# Patient Record
Sex: FEMALE | Race: WHITE | NOT HISPANIC OR LATINO
[De-identification: names, ages, dates, MRNs, and addresses within clinical notes are randomized per-mention and may not be internally consistent; named-entity substitution may affect disease eponyms.]

---

## 2019-06-09 ENCOUNTER — TRANSCRIPTION ENCOUNTER (OUTPATIENT)
Age: 64
End: 2019-06-09

## 2019-12-31 ENCOUNTER — RECORD ABSTRACTING (OUTPATIENT)
Age: 64
End: 2019-12-31

## 2019-12-31 DIAGNOSIS — Z72.89 OTHER PROBLEMS RELATED TO LIFESTYLE: ICD-10-CM

## 2019-12-31 DIAGNOSIS — Z80.3 FAMILY HISTORY OF MALIGNANT NEOPLASM OF BREAST: ICD-10-CM

## 2019-12-31 DIAGNOSIS — Z87.09 PERSONAL HISTORY OF OTHER DISEASES OF THE RESPIRATORY SYSTEM: ICD-10-CM

## 2019-12-31 DIAGNOSIS — Z80.8 FAMILY HISTORY OF MALIGNANT NEOPLASM OF OTHER ORGANS OR SYSTEMS: ICD-10-CM

## 2019-12-31 DIAGNOSIS — Z86.59 PERSONAL HISTORY OF OTHER MENTAL AND BEHAVIORAL DISORDERS: ICD-10-CM

## 2019-12-31 DIAGNOSIS — Z82.49 FAMILY HISTORY OF ISCHEMIC HEART DISEASE AND OTHER DISEASES OF THE CIRCULATORY SYSTEM: ICD-10-CM

## 2019-12-31 LAB — CYTOLOGY CVX/VAG DOC THIN PREP: NORMAL

## 2020-02-27 ENCOUNTER — APPOINTMENT (OUTPATIENT)
Dept: OBGYN | Facility: CLINIC | Age: 65
End: 2020-02-27

## 2020-06-05 ENCOUNTER — APPOINTMENT (RX ONLY)
Dept: URBAN - METROPOLITAN AREA CLINIC 146 | Facility: CLINIC | Age: 65
Setting detail: DERMATOLOGY
End: 2020-06-05

## 2020-06-05 VITALS — TEMPERATURE: 96.62 F

## 2020-06-05 DIAGNOSIS — L57.8 OTHER SKIN CHANGES DUE TO CHRONIC EXPOSURE TO NONIONIZING RADIATION: ICD-10-CM

## 2020-06-05 DIAGNOSIS — L81.4 OTHER MELANIN HYPERPIGMENTATION: ICD-10-CM

## 2020-06-05 DIAGNOSIS — T07XXXA INSECT BITE, NONVENOMOUS, OF OTHER, MULTIPLE, AND UNSPECIFIED SITES, WITHOUT MENTION OF INFECTION: ICD-10-CM

## 2020-06-05 DIAGNOSIS — L82.1 OTHER SEBORRHEIC KERATOSIS: ICD-10-CM

## 2020-06-05 DIAGNOSIS — D485 NEOPLASM OF UNCERTAIN BEHAVIOR OF SKIN: ICD-10-CM

## 2020-06-05 DIAGNOSIS — L73.8 OTHER SPECIFIED FOLLICULAR DISORDERS: ICD-10-CM

## 2020-06-05 PROBLEM — S20.369A INSECT BITE (NONVENOMOUS) OF UNSPECIFIED FRONT WALL OF THORAX, INITIAL ENCOUNTER: Status: ACTIVE | Noted: 2020-06-05

## 2020-06-05 PROBLEM — S20.162A INSECT BITE (NONVENOMOUS) OF BREAST, LEFT BREAST, INITIAL ENCOUNTER: Status: ACTIVE | Noted: 2020-06-05

## 2020-06-05 PROBLEM — D48.5 NEOPLASM OF UNCERTAIN BEHAVIOR OF SKIN: Status: ACTIVE | Noted: 2020-06-05

## 2020-06-05 PROCEDURE — ? BIOPSY BY SHAVE METHOD

## 2020-06-05 PROCEDURE — ? COUNSELING

## 2020-06-05 PROCEDURE — 99203 OFFICE O/P NEW LOW 30 MIN: CPT | Mod: 25

## 2020-06-05 PROCEDURE — 11102 TANGNTL BX SKIN SINGLE LES: CPT

## 2020-06-05 PROCEDURE — ? FULL BODY SKIN EXAM

## 2020-06-05 ASSESSMENT — LOCATION SIMPLE DESCRIPTION DERM
LOCATION SIMPLE: CHEST
LOCATION SIMPLE: RIGHT UPPER BACK
LOCATION SIMPLE: LEFT CHEEK
LOCATION SIMPLE: RIGHT FOREHEAD
LOCATION SIMPLE: LEFT SHOULDER
LOCATION SIMPLE: LEFT BREAST
LOCATION SIMPLE: LEFT UPPER BACK
LOCATION SIMPLE: RIGHT SHOULDER
LOCATION SIMPLE: RIGHT POSTERIOR UPPER ARM
LOCATION SIMPLE: LEFT ELBOW
LOCATION SIMPLE: RIGHT CHEEK

## 2020-06-05 ASSESSMENT — LOCATION DETAILED DESCRIPTION DERM
LOCATION DETAILED: MIDDLE STERNUM
LOCATION DETAILED: RIGHT ANTERIOR SHOULDER
LOCATION DETAILED: LEFT MID-UPPER BACK
LOCATION DETAILED: LEFT MEDIAL BREAST 10-11:00 REGION
LOCATION DETAILED: RIGHT PROXIMAL POSTERIOR UPPER ARM
LOCATION DETAILED: RIGHT INFERIOR CENTRAL MALAR CHEEK
LOCATION DETAILED: RIGHT MEDIAL UPPER BACK
LOCATION DETAILED: LEFT INFERIOR CENTRAL MALAR CHEEK
LOCATION DETAILED: LEFT SUPERIOR UPPER BACK
LOCATION DETAILED: RIGHT INFERIOR LATERAL FOREHEAD
LOCATION DETAILED: LEFT MEDIAL SUPERIOR CHEST
LOCATION DETAILED: LEFT ANTERIOR SHOULDER
LOCATION DETAILED: LEFT MEDIAL UPPER BACK
LOCATION DETAILED: RIGHT POSTERIOR SHOULDER
LOCATION DETAILED: RIGHT LATERAL FOREHEAD
LOCATION DETAILED: RIGHT INFERIOR UPPER BACK
LOCATION DETAILED: RIGHT INFERIOR FOREHEAD
LOCATION DETAILED: LEFT ELBOW
LOCATION DETAILED: LEFT POSTERIOR SHOULDER

## 2020-06-05 ASSESSMENT — LOCATION ZONE DERM
LOCATION ZONE: FACE
LOCATION ZONE: TRUNK
LOCATION ZONE: ARM

## 2021-09-08 ENCOUNTER — APPOINTMENT (RX ONLY)
Dept: URBAN - METROPOLITAN AREA CLINIC 146 | Facility: CLINIC | Age: 66
Setting detail: DERMATOLOGY
End: 2021-09-08

## 2021-09-08 DIAGNOSIS — L81.4 OTHER MELANIN HYPERPIGMENTATION: ICD-10-CM

## 2021-09-08 DIAGNOSIS — L82.1 OTHER SEBORRHEIC KERATOSIS: ICD-10-CM

## 2021-09-08 DIAGNOSIS — L30.1 DYSHIDROSIS [POMPHOLYX]: ICD-10-CM

## 2021-09-08 PROCEDURE — ? FULL BODY SKIN EXAM - DECLINED

## 2021-09-08 PROCEDURE — 99213 OFFICE O/P EST LOW 20 MIN: CPT

## 2021-09-08 PROCEDURE — ? COUNSELING

## 2021-09-08 PROCEDURE — ? MEDICATION COUNSELING

## 2021-09-08 PROCEDURE — ? ADDITIONAL NOTES

## 2021-09-08 PROCEDURE — ? PRESCRIPTION

## 2021-09-08 RX ORDER — HALOBETASOL PROPIONATE 0.5 MG/G
OINTMENT TOPICAL
Qty: 50 | Refills: 2 | Status: ERX | COMMUNITY
Start: 2021-09-08

## 2021-09-08 RX ADMIN — HALOBETASOL PROPIONATE: 0.5 OINTMENT TOPICAL at 00:00

## 2021-09-08 ASSESSMENT — LOCATION ZONE DERM
LOCATION ZONE: FACE
LOCATION ZONE: TRUNK
LOCATION ZONE: HAND
LOCATION ZONE: ARM

## 2021-09-08 ASSESSMENT — LOCATION DETAILED DESCRIPTION DERM
LOCATION DETAILED: RIGHT INFERIOR UPPER BACK
LOCATION DETAILED: RIGHT LATERAL ABDOMEN
LOCATION DETAILED: RIGHT RADIAL DORSAL HAND
LOCATION DETAILED: LEFT SUPERIOR MEDIAL MALAR CHEEK
LOCATION DETAILED: LEFT ULNAR DORSAL HAND
LOCATION DETAILED: LEFT ANTERIOR SHOULDER

## 2021-09-08 ASSESSMENT — LOCATION SIMPLE DESCRIPTION DERM
LOCATION SIMPLE: ABDOMEN
LOCATION SIMPLE: RIGHT HAND
LOCATION SIMPLE: LEFT HAND
LOCATION SIMPLE: LEFT SHOULDER
LOCATION SIMPLE: RIGHT UPPER BACK
LOCATION SIMPLE: LEFT CHEEK

## 2021-09-08 NOTE — PROCEDURE: MEDICATION COUNSELING
CLINICAL PHARMACY NOTE: MEDS TO [address] Select Patient?: No  Total # of Prescriptions Filled: 2   The following medications were delivered to the patient:  · On file  Total # of Interventions Completed: 1  Time Spent (min): 15    Additional Documentation: Oxybutynin Counseling:  I discussed with the patient the risks of oxybutynin including but not limited to skin rash, drowsiness, dry mouth, difficulty urinating, and blurred vision.

## 2021-10-06 ENCOUNTER — APPOINTMENT (OUTPATIENT)
Dept: INTERNAL MEDICINE | Facility: CLINIC | Age: 66
End: 2021-10-06
Payer: MEDICARE

## 2021-10-06 ENCOUNTER — RESULT CHARGE (OUTPATIENT)
Age: 66
End: 2021-10-06

## 2021-10-06 VITALS
BODY MASS INDEX: 39.61 KG/M2 | SYSTOLIC BLOOD PRESSURE: 104 MMHG | DIASTOLIC BLOOD PRESSURE: 60 MMHG | HEIGHT: 64 IN | WEIGHT: 232 LBS

## 2021-10-06 VITALS
DIASTOLIC BLOOD PRESSURE: 60 MMHG | HEIGHT: 64 IN | WEIGHT: 232 LBS | SYSTOLIC BLOOD PRESSURE: 104 MMHG | BODY MASS INDEX: 39.61 KG/M2

## 2021-10-06 DIAGNOSIS — Z87.2 PERSONAL HISTORY OF DISEASES OF THE SKIN AND SUBCUTANEOUS TISSUE: ICD-10-CM

## 2021-10-06 PROCEDURE — 85610 PROTHROMBIN TIME: CPT | Mod: QW

## 2021-10-06 PROCEDURE — 99203 OFFICE O/P NEW LOW 30 MIN: CPT | Mod: 25

## 2021-10-06 RX ORDER — DESVENLAFAXINE SUCCINATE 50 MG/1
50 TABLET, EXTENDED RELEASE ORAL
Refills: 0 | Status: DISCONTINUED | COMMUNITY
End: 2021-10-06

## 2021-10-06 NOTE — HISTORY OF PRESENT ILLNESS
[FreeTextEntry8] : 66-year-old right-handed female with PAF, initial visit, recently moved back from [place] to be with her 4 children and 7 grandchildren. She recently had an annual exam in [place], records have been requested. The only information she has with her as on her phone which shows a calcium of 11.1 and a PTH of 95 which she was advised to follow up on.\par Patient states she fell forward and landed on her outstretched arms about 4 months ago and her right shoulder still bothers her, she is unable to elevate right shoulder completely and it's painful to lift anything.\par She is also on Coumadin, today's INR is WNL, she states usually is within normal limits and she had checked every 3 months in [place]\par \par She states she quit smoking 3 weeks ago

## 2021-10-06 NOTE — PHYSICAL EXAM
[No Acute Distress] : no acute distress [Well Nourished] : well nourished [Well Developed] : well developed [Well-Appearing] : well-appearing [Normal Sclera/Conjunctiva] : normal sclera/conjunctiva [PERRL] : pupils equal round and reactive to light [EOMI] : extraocular movements intact [Normal Outer Ear/Nose] : the outer ears and nose were normal in appearance [Normal Oropharynx] : the oropharynx was normal [No JVD] : no jugular venous distention [No Lymphadenopathy] : no lymphadenopathy [Supple] : supple [Thyroid Normal, No Nodules] : the thyroid was normal and there were no nodules present [No Respiratory Distress] : no respiratory distress  [No Accessory Muscle Use] : no accessory muscle use [Clear to Auscultation] : lungs were clear to auscultation bilaterally [Normal Rate] : normal rate  [Regular Rhythm] : with a regular rhythm [Normal S1, S2] : normal S1 and S2 [No Murmur] : no murmur heard [No Carotid Bruits] : no carotid bruits [No Abdominal Bruit] : a ~M bruit was not heard ~T in the abdomen [No Varicosities] : no varicosities [Pedal Pulses Present] : the pedal pulses are present [No Edema] : there was no peripheral edema [No Palpable Aorta] : no palpable aorta [No Extremity Clubbing/Cyanosis] : no extremity clubbing/cyanosis [Soft] : abdomen soft [Non Tender] : non-tender [Non-distended] : non-distended [No Masses] : no abdominal mass palpated [No HSM] : no HSM [Normal Bowel Sounds] : normal bowel sounds [Normal Posterior Cervical Nodes] : no posterior cervical lymphadenopathy [Normal Anterior Cervical Nodes] : no anterior cervical lymphadenopathy [No CVA Tenderness] : no CVA  tenderness [No Spinal Tenderness] : no spinal tenderness [No Joint Swelling] : no joint swelling [Grossly Normal Strength/Tone] : grossly normal strength/tone [No Rash] : no rash [Coordination Grossly Intact] : coordination grossly intact [No Focal Deficits] : no focal deficits [Normal Gait] : normal gait [Deep Tendon Reflexes (DTR)] : deep tendon reflexes were 2+ and symmetric [Normal Affect] : the affect was normal [Normal Insight/Judgement] : insight and judgment were intact [de-identified] : Unable to elevate right shoulder above shoulder height

## 2021-10-06 NOTE — PLAN
[FreeTextEntry1] : 66-year-old female as noted, referred to Dr. Yael Vera hyperparathyroidism\par \par Records requested from PCP in [place]\par \par Given prescription for physical therapy for right shoulder, if no improvement will refer to orthopedist\par \par Continue Coumadin 5 mg daily and repeat INR in one month\par \par Patient believes she had a chest CT, lung cancer screening in [place]\par \par Reviewed diet and stressed the importance of regular exercise i.e. walking\par \par Patient will call in 3 weeks regarding appointment with Dr. Vera and to make sure we have received her records

## 2021-11-05 ENCOUNTER — APPOINTMENT (OUTPATIENT)
Dept: ENDOCRINOLOGY | Facility: CLINIC | Age: 66
End: 2021-11-05
Payer: MEDICARE

## 2021-11-05 ENCOUNTER — APPOINTMENT (OUTPATIENT)
Dept: INTERNAL MEDICINE | Facility: CLINIC | Age: 66
End: 2021-11-05
Payer: MEDICARE

## 2021-11-05 VITALS
BODY MASS INDEX: 39.78 KG/M2 | HEART RATE: 96 BPM | SYSTOLIC BLOOD PRESSURE: 134 MMHG | HEIGHT: 64 IN | OXYGEN SATURATION: 66 % | TEMPERATURE: 97.1 F | DIASTOLIC BLOOD PRESSURE: 70 MMHG | WEIGHT: 233 LBS

## 2021-11-05 VITALS
HEIGHT: 64 IN | DIASTOLIC BLOOD PRESSURE: 70 MMHG | BODY MASS INDEX: 39.78 KG/M2 | WEIGHT: 233 LBS | SYSTOLIC BLOOD PRESSURE: 134 MMHG

## 2021-11-05 LAB — INR PPP: 1.7 RATIO

## 2021-11-05 PROCEDURE — 85610 PROTHROMBIN TIME: CPT | Mod: QW

## 2021-11-05 PROCEDURE — 99205 OFFICE O/P NEW HI 60 MIN: CPT | Mod: 25

## 2021-11-05 PROCEDURE — 99213 OFFICE O/P EST LOW 20 MIN: CPT | Mod: 25

## 2021-11-05 NOTE — PHYSICAL EXAM
[No Acute Distress] : no acute distress [Well Nourished] : well nourished [Well Developed] : well developed [Well-Appearing] : well-appearing [Normal Sclera/Conjunctiva] : normal sclera/conjunctiva [PERRL] : pupils equal round and reactive to light [EOMI] : extraocular movements intact [Normal Outer Ear/Nose] : the outer ears and nose were normal in appearance [Normal Oropharynx] : the oropharynx was normal [No JVD] : no jugular venous distention [Supple] : supple [No Lymphadenopathy] : no lymphadenopathy [Thyroid Normal, No Nodules] : the thyroid was normal and there were no nodules present [No Respiratory Distress] : no respiratory distress  [No Accessory Muscle Use] : no accessory muscle use [Clear to Auscultation] : lungs were clear to auscultation bilaterally [Normal Rate] : normal rate  [Normal S1, S2] : normal S1 and S2 [No Murmur] : no murmur heard [No Carotid Bruits] : no carotid bruits [No Abdominal Bruit] : a ~M bruit was not heard ~T in the abdomen [No Varicosities] : no varicosities [Pedal Pulses Present] : the pedal pulses are present [No Edema] : there was no peripheral edema [No Palpable Aorta] : no palpable aorta [No Extremity Clubbing/Cyanosis] : no extremity clubbing/cyanosis [Soft] : abdomen soft [Non Tender] : non-tender [Non-distended] : non-distended [No Masses] : no abdominal mass palpated [No HSM] : no HSM [Normal Bowel Sounds] : normal bowel sounds [Normal Posterior Cervical Nodes] : no posterior cervical lymphadenopathy [Normal Anterior Cervical Nodes] : no anterior cervical lymphadenopathy [No CVA Tenderness] : no CVA  tenderness [No Spinal Tenderness] : no spinal tenderness [No Joint Swelling] : no joint swelling [Grossly Normal Strength/Tone] : grossly normal strength/tone [No Rash] : no rash [No Focal Deficits] : no focal deficits [Coordination Grossly Intact] : coordination grossly intact [Normal Gait] : normal gait [Normal Affect] : the affect was normal [Deep Tendon Reflexes (DTR)] : deep tendon reflexes were 2+ and symmetric [Normal Insight/Judgement] : insight and judgment were intact [de-identified] : rregular irregular rhythm

## 2021-11-05 NOTE — REVIEW OF SYSTEMS
[Fatigue] : fatigue [Nocturia] : nocturia [Muscle Weakness] : muscle weakness [Muscle Cramps] : muscle cramps [Headaches] : headaches [Cold Intolerance] : cold intolerance [Negative] : Heme/Lymph

## 2021-11-05 NOTE — PLAN
[FreeTextEntry1] : 66-year-old female with a presents for followup and INR. Has an appointment with endocrinologist today to deal with hyperparathyroidism.\par \par Feels well, denies chest pain shortness of breath palpitations dizziness\par \par INR today is 1.7 advised patient to take her usual 5 mg daily except 7-1/2 mg of Coumadin Tuesdays and Fridays for 2 weeks then resume 5 mg daily. Return to office 11/29/21

## 2021-11-05 NOTE — HISTORY OF PRESENT ILLNESS
[FreeTextEntry1] : Followup AF, INR [de-identified] : 66-year-old female with AF presents for followup and INR. Feels well, denies chest pain shortness of breath palpitations dizziness. Currently takes Coumadin 5 mg daily which she's been doing for years with only occasional adjustments.Today INR is 1.7

## 2021-11-12 ENCOUNTER — RESULT REVIEW (OUTPATIENT)
Age: 66
End: 2021-11-12

## 2021-11-22 ENCOUNTER — TRANSCRIPTION ENCOUNTER (OUTPATIENT)
Age: 66
End: 2021-11-22

## 2021-11-22 ENCOUNTER — RX RENEWAL (OUTPATIENT)
Age: 66
End: 2021-11-22

## 2021-11-25 LAB
24R-OH-CALCIDIOL SERPL-MCNC: 103 PG/ML
25(OH)D3 SERPL-MCNC: 25.7 NG/ML
ALBUMIN SERPL ELPH-MCNC: 4.7 G/DL
ALP BLD-CCNC: 81 U/L
ANION GAP SERPL CALC-SCNC: 12 MMOL/L
BASOPHILS # BLD AUTO: 0.03 K/UL
BASOPHILS NFR BLD AUTO: 0.5 %
BUN SERPL-MCNC: 8 MG/DL
CA-I SERPL-SCNC: 1.44 MMOL/L
CALCIUM SERPL-MCNC: 11.1 MG/DL
CALCIUM SERPL-MCNC: 11.1 MG/DL
CHLORIDE SERPL-SCNC: 104 MMOL/L
CO2 SERPL-SCNC: 22 MMOL/L
CREAT SERPL-MCNC: 0.54 MG/DL
EOSINOPHIL # BLD AUTO: 0.07 K/UL
EOSINOPHIL NFR BLD AUTO: 1.1 %
FERRITIN SERPL-MCNC: 258 NG/ML
GLUCOSE SERPL-MCNC: 114 MG/DL
HCT VFR BLD CALC: 43.6 %
HGB BLD-MCNC: 14.3 G/DL
IMM GRANULOCYTES NFR BLD AUTO: 0.2 %
IRON SATN MFR SERPL: 18 %
IRON SERPL-MCNC: 63 UG/DL
LYMPHOCYTES # BLD AUTO: 2.09 K/UL
LYMPHOCYTES NFR BLD AUTO: 31.6 %
MAGNESIUM SERPL-MCNC: 1.9 MG/DL
MAN DIFF?: NORMAL
MCHC RBC-ENTMCNC: 30.8 PG
MCHC RBC-ENTMCNC: 32.8 GM/DL
MCV RBC AUTO: 93.8 FL
MONOCYTES # BLD AUTO: 0.57 K/UL
MONOCYTES NFR BLD AUTO: 8.6 %
NEUTROPHILS # BLD AUTO: 3.84 K/UL
NEUTROPHILS NFR BLD AUTO: 58 %
PARATHYROID HORMONE INTACT: 104 PG/ML
PHOSPHATE SERPL-MCNC: 2.1 MG/DL
PLATELET # BLD AUTO: 191 K/UL
POTASSIUM SERPL-SCNC: 4.6 MMOL/L
RBC # BLD: 4.65 M/UL
RBC # FLD: 13.1 %
SODIUM SERPL-SCNC: 138 MMOL/L
T4 FREE SERPL-MCNC: 1.2 NG/DL
TIBC SERPL-MCNC: 341 UG/DL
TSH SERPL-ACNC: 0.79 UIU/ML
UIBC SERPL-MCNC: 278 UG/DL
WBC # FLD AUTO: 6.61 K/UL

## 2021-11-25 NOTE — CONSULT LETTER
[Dear  ___] : Dear ~ANTOLIN, [Consult Letter:] : I had the pleasure of evaluating your patient, [unfilled]. [Please see my note below.] : Please see my note below. [Consult Closing:] : Thank you very much for allowing me to participate in the care of this patient.  If you have any questions, please do not hesitate to contact me. [Sincerely,] : Sincerely, [FreeTextEntry3] : Sincerely,\par \par TOMASZ BOND MD\par [facility]\par [facility]\par

## 2021-11-25 NOTE — HISTORY OF PRESENT ILLNESS
[FreeTextEntry1] : Ms. GUADALUPE SNIDER is a 66 year old female\par sent in a consult by Abbey Martinez for hypercalcemia, smoker 5 cigarettes per day \par has a Pulmonary doctor last seen last year for asthma, was advised for sleep study \par \par had kidney stones twice  30yrs ago and 15yrs ago\par \par denies fractures, has had a lot of falling\par no recent DEXA due, last few years ago was ok in [place] \par \par first time she was told she has high calcium was 4yrs ago\par denies FHx hypercalcemia or kidney stones \par \par also h/o thyroid nodule FNAx 14 yrs ago indeterminate  34yrs ago benign isthmectomy in [place]  per pt when her son was few months old when this happened\par \par calcium 11.5\par iPTH 95\par per pt normal EGFR \par

## 2021-11-25 NOTE — REASON FOR VISIT
[Initial Evaluation] : an initial evaluation [Other___] : [unfilled] [Hyperparathyroidism] : hyperparathyroidism [FreeTextEntry2] : Abbey Martinez, NP

## 2021-11-25 NOTE — PHYSICAL EXAM

## 2021-11-29 ENCOUNTER — APPOINTMENT (OUTPATIENT)
Dept: INTERNAL MEDICINE | Facility: CLINIC | Age: 66
End: 2021-11-29

## 2021-12-02 ENCOUNTER — NON-APPOINTMENT (OUTPATIENT)
Age: 66
End: 2021-12-02

## 2021-12-03 ENCOUNTER — APPOINTMENT (OUTPATIENT)
Dept: CARDIOLOGY | Facility: CLINIC | Age: 66
End: 2021-12-03
Payer: MEDICARE

## 2021-12-03 ENCOUNTER — NON-APPOINTMENT (OUTPATIENT)
Age: 66
End: 2021-12-03

## 2021-12-03 VITALS
DIASTOLIC BLOOD PRESSURE: 70 MMHG | HEIGHT: 64 IN | WEIGHT: 232 LBS | BODY MASS INDEX: 39.61 KG/M2 | HEART RATE: 56 BPM | SYSTOLIC BLOOD PRESSURE: 132 MMHG

## 2021-12-03 DIAGNOSIS — N60.19 DIFFUSE CYSTIC MASTOPATHY OF UNSPECIFIED BREAST: ICD-10-CM

## 2021-12-03 DIAGNOSIS — Z87.442 PERSONAL HISTORY OF URINARY CALCULI: ICD-10-CM

## 2021-12-03 DIAGNOSIS — D25.1 INTRAMURAL LEIOMYOMA OF UTERUS: ICD-10-CM

## 2021-12-03 DIAGNOSIS — I25.2 OLD MYOCARDIAL INFARCTION: ICD-10-CM

## 2021-12-03 DIAGNOSIS — N95.0 POSTMENOPAUSAL BLEEDING: ICD-10-CM

## 2021-12-03 DIAGNOSIS — R68.89 OTHER GENERAL SYMPTOMS AND SIGNS: ICD-10-CM

## 2021-12-03 PROCEDURE — 99215 OFFICE O/P EST HI 40 MIN: CPT

## 2021-12-03 PROCEDURE — 99417 PROLNG OP E/M EACH 15 MIN: CPT

## 2021-12-03 PROCEDURE — 93000 ELECTROCARDIOGRAM COMPLETE: CPT

## 2021-12-03 RX ORDER — ALPRAZOLAM 0.25 MG/1
0.25 TABLET ORAL
Refills: 0 | Status: DISCONTINUED | COMMUNITY
End: 2021-12-03

## 2021-12-03 NOTE — HISTORY OF PRESENT ILLNESS
[FreeTextEntry1] : This 66-year-old female is referred by Abbey Landry.  She was seen here in 2010 has since moved to [place] but is now back.  In 2010 she was seen for tachycardia, AV penelope reentrant treated with adenosine in the emergency room referred to electrophysiology.  She underwent successful ablation.\par \par About 2 years ago while visiting [place] she was found unconscious and was diagnosed with stress-induced cardiomyopathy, she was hospitalized at [facility] cardiac testing was done we have called for the records.  She was told of heart recovery.\par \par She was admitted to [facility] November 29, 2021 with a syncopal episode after becoming dyspneic rushing up a hill.  There she was seen by neurology, had a normal CAT scan.  She was seen by cardiology there was noted to be an elevation in troponin from 22- 211.  An echocardiogram showed low normal left ventricular systolic function with septal akinesis.\par \par \par She denies chest pain, dyspnea, palpitations or syncope. She wheezes from asthma. She was smoking  for 45 years, stopped 2 weeks ago in anticipation for parathyroid surgery. \par \par \par

## 2021-12-03 NOTE — CARDIOLOGY SUMMARY
[de-identified] : 12/3/2021- sinus bradycardia vpc, T wave inversion v2-v3, avL [de-identified] : 11/30/21- ef 50-55%, septal akinesis, mild MR

## 2021-12-27 ENCOUNTER — APPOINTMENT (OUTPATIENT)
Dept: INTERNAL MEDICINE | Facility: CLINIC | Age: 66
End: 2021-12-27
Payer: MEDICARE

## 2021-12-27 ENCOUNTER — RESULT REVIEW (OUTPATIENT)
Age: 66
End: 2021-12-27

## 2021-12-27 VITALS
DIASTOLIC BLOOD PRESSURE: 70 MMHG | WEIGHT: 232 LBS | BODY MASS INDEX: 39.61 KG/M2 | SYSTOLIC BLOOD PRESSURE: 120 MMHG | HEIGHT: 64 IN

## 2021-12-27 LAB — INR PPP: 1.7 RATIO

## 2021-12-27 PROCEDURE — 99213 OFFICE O/P EST LOW 20 MIN: CPT | Mod: 25

## 2021-12-27 NOTE — HISTORY OF PRESENT ILLNESS
[FreeTextEntry1] : Followup PAF, INR, thyroid nodule Bx scheduled [de-identified] : 66-year-old female presents very confused about procedure she is having Wednesday which is a thyroid nodule biopsy with Dr Bustillos. She does not remember being told exactly what she was having done, she thought she was having a parathyroid biopsy. Endocrinologist note is very detailed but patient does not recall the information. This has happened in the past, I talked to her daughter Zuly regarding this in the past and she is very much aware of the situation.\par \par Explained to patient what procedure she was having and to return to office one week after restarting her Coumadin for followup

## 2021-12-27 NOTE — PLAN
[FreeTextEntry1] : 66-year-old female as noted, she stopped Coumadin 12/24 as instructed, INR today 1.7. As noted is to followup one week after restarting her Coumadin. Thyroid biopsy scheduled for Wednesday 12/29. Patient feels well, denies chest pain shortness of breath palpitations dizziness

## 2021-12-28 ENCOUNTER — RESULT REVIEW (OUTPATIENT)
Age: 66
End: 2021-12-28

## 2021-12-29 ENCOUNTER — RESULT REVIEW (OUTPATIENT)
Age: 66
End: 2021-12-29

## 2022-01-14 ENCOUNTER — APPOINTMENT (OUTPATIENT)
Dept: CARDIOLOGY | Facility: CLINIC | Age: 67
End: 2022-01-14
Payer: MEDICARE

## 2022-01-14 ENCOUNTER — NON-APPOINTMENT (OUTPATIENT)
Age: 67
End: 2022-01-14

## 2022-01-14 VITALS
HEART RATE: 85 BPM | BODY MASS INDEX: 40.29 KG/M2 | HEIGHT: 64 IN | SYSTOLIC BLOOD PRESSURE: 120 MMHG | TEMPERATURE: 97.9 F | DIASTOLIC BLOOD PRESSURE: 70 MMHG | OXYGEN SATURATION: 96 % | WEIGHT: 236 LBS

## 2022-01-14 DIAGNOSIS — R77.8 OTHER SPECIFIED ABNORMALITIES OF PLASMA PROTEINS: ICD-10-CM

## 2022-01-14 DIAGNOSIS — Z98.890 OTHER SPECIFIED POSTPROCEDURAL STATES: ICD-10-CM

## 2022-01-14 DIAGNOSIS — R94.31 ABNORMAL ELECTROCARDIOGRAM [ECG] [EKG]: ICD-10-CM

## 2022-01-14 PROCEDURE — 93000 ELECTROCARDIOGRAM COMPLETE: CPT

## 2022-01-14 PROCEDURE — 99214 OFFICE O/P EST MOD 30 MIN: CPT

## 2022-01-14 PROCEDURE — 36415 COLL VENOUS BLD VENIPUNCTURE: CPT

## 2022-01-15 PROBLEM — R77.8 ELEVATED TROPONIN: Status: ACTIVE | Noted: 2021-12-03

## 2022-01-15 PROBLEM — R94.31 ABNORMAL EKG: Status: ACTIVE | Noted: 2021-12-03

## 2022-01-18 ENCOUNTER — APPOINTMENT (OUTPATIENT)
Dept: SURGERY | Facility: CLINIC | Age: 67
End: 2022-01-18
Payer: MEDICARE

## 2022-01-18 VITALS — HEIGHT: 64 IN | WEIGHT: 236 LBS | BODY MASS INDEX: 40.29 KG/M2

## 2022-01-18 DIAGNOSIS — Z82.49 FAMILY HISTORY OF ISCHEMIC HEART DISEASE AND OTHER DISEASES OF THE CIRCULATORY SYSTEM: ICD-10-CM

## 2022-01-18 DIAGNOSIS — Z80.8 FAMILY HISTORY OF MALIGNANT NEOPLASM OF OTHER ORGANS OR SYSTEMS: ICD-10-CM

## 2022-01-18 DIAGNOSIS — Z83.3 FAMILY HISTORY OF DIABETES MELLITUS: ICD-10-CM

## 2022-01-18 PROCEDURE — 99203 OFFICE O/P NEW LOW 30 MIN: CPT

## 2022-01-18 NOTE — ASSESSMENT
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  Patient has been worked up by Dr. Vera and referred to a surgeon at [facility].  Patient has no new complaints.  She presents to my office to discuss her problem.\par \par Physical examination HEENT visually there is no acute findings.  She has no discrete mass in her neck on either side.  She has a scar at the base of her neck secondary to previous thyroid surgery\par \par Hypercalcemia: This is a 58-year-old female diagnosed with having high levels of calcium.  She has an ultrasound that is suggestive of a parathyroid adenoma in the left lower lobe.  She was evaluated by an ENT surgeon at [facility].  Given the fact that this type of surgery has better outcome with people who do more of this surgery than people who do less I think she should be operated on by a surgeon who does more parathyroid surgery than myself.  Patient will call Dr. Vera for referral referral regarding parathyroid surgery given the fact that the surgeon she was referred to did not take her insurance.\par \par Thank you for allowing me to participate in the care of your patient.  Should you have any questions please feel free to give me a call.\par \par Sincerely yours

## 2022-01-20 PROCEDURE — 93244 EXT ECG>48HR<7D REV&INTERPJ: CPT

## 2022-01-25 ENCOUNTER — APPOINTMENT (OUTPATIENT)
Dept: ENDOCRINOLOGY | Facility: CLINIC | Age: 67
End: 2022-01-25
Payer: MEDICARE

## 2022-01-25 ENCOUNTER — APPOINTMENT (OUTPATIENT)
Dept: FAMILY MEDICINE | Facility: CLINIC | Age: 67
End: 2022-01-25

## 2022-01-25 VITALS — WEIGHT: 232 LBS | HEIGHT: 64 IN | BODY MASS INDEX: 39.61 KG/M2

## 2022-01-25 PROCEDURE — 99215 OFFICE O/P EST HI 40 MIN: CPT | Mod: 95

## 2022-01-25 NOTE — HISTORY OF PRESENT ILLNESS
[Home] : at home, [unfilled] , at the time of the visit. [Medical Office: ([facility])___] : at the medical office located in  [Verbal consent obtained from patient] : the patient, [unfilled] [FreeTextEntry1] : Ms. GUADALUPE SNIDER is a 66 year old female\par sent in a consult by Abbey Martinez for hypercalcemia, smoker 5 cigarettes per day \par has a Pulmonary doctor last seen last year for asthma, was advised for sleep study \par \par had kidney stones twice  30yrs ago and 15yrs ago\par \par denies fractures, has had a lot of falling\par no recent DEXA due, last few years ago was ok in [place] \par \par first time she was told she has high calcium was 4yrs ago\par denies FHx hypercalcemia or kidney stones \par \par also h/o thyroid nodule FNAx 14 yrs ago indeterminate  34yrs ago benign isthmectomy in [place]  per pt when her son was few months old when this happened\par \par calcium 11.5\par iPTH 95\par per pt normal EGFR \par

## 2022-01-25 NOTE — PHYSICAL EXAM
[Alert] : alert [Well Nourished] : well nourished [No Acute Distress] : no acute distress [Well Developed] : well developed [Normal Sclera/Conjunctiva] : normal sclera/conjunctiva [EOMI] : extra ocular movement intact [No Proptosis] : no proptosis [Normal Oropharynx] : the oropharynx was normal [No Thyroid Nodules] : no palpable thyroid nodules [No Respiratory Distress] : no respiratory distress [No Accessory Muscle Use] : no accessory muscle use [Clear to Auscultation] : lungs were clear to auscultation bilaterally [Normal S1, S2] : normal S1 and S2 [Normal Rate] : heart rate was normal [Regular Rhythm] : with a regular rhythm [No Edema] : no peripheral edema [Pedal Pulses Normal] : the pedal pulses are present [Normal Bowel Sounds] : normal bowel sounds [Not Tender] : non-tender [Not Distended] : not distended [Soft] : abdomen soft [Normal Anterior Cervical Nodes] : no anterior cervical lymphadenopathy [No Spinal Tenderness] : no spinal tenderness [Spine Straight] : spine straight [No Stigmata of Cushings Syndrome] : no stigmata of Cushings Syndrome [Normal Gait] : normal gait [Normal Strength/Tone] : muscle strength and tone were normal [No Rash] : no rash [Normal Reflexes] : deep tendon reflexes were 2+ and symmetric [No Tremors] : no tremors [Oriented x3] : oriented to person, place, and time [Acanthosis Nigricans] : no acanthosis nigricans [de-identified] : mildly enlarged thyroid on exam

## 2022-01-25 NOTE — CONSULT LETTER
[Consult Letter:] : I had the pleasure of evaluating your patient, [unfilled]. [Please see my note below.] : Please see my note below. [Consult Closing:] : Thank you very much for allowing me to participate in the care of this patient.  If you have any questions, please do not hesitate to contact me. [Sincerely,] : Sincerely, [Dear  ___] : Dear ~ANTOLIN, [FreeTextEntry3] : Sincerely,\par \par TOMASZ BOND MD\par [facility]\par [facility]\par

## 2022-01-25 NOTE — REASON FOR VISIT
[Initial Evaluation] : an initial evaluation [Hyperparathyroidism] : hyperparathyroidism [Other___] : [unfilled] [Follow - Up] : a follow-up visit [FreeTextEntry2] : Abbey Martinez, NP

## 2022-01-25 NOTE — CONSULT LETTER
[Consult Letter:] : I had the pleasure of evaluating your patient, [unfilled]. [Please see my note below.] : Please see my note below. [Consult Closing:] : Thank you very much for allowing me to participate in the care of this patient.  If you have any questions, please do not hesitate to contact me. [Sincerely,] : Sincerely, [Dear  ___] : Dear ~ANTOLIN, [FreeTextEntry3] : Sincerely,\par \par TOMASZ BOND MD\[facility]\par [facility]\par

## 2022-01-25 NOTE — HISTORY OF PRESENT ILLNESS
[Home] : at home, [unfilled] , at the time of the visit. [Medical Office: ([facility])___] : at the medical office located in  [Verbal consent obtained from patient] : the patient, [unfilled] [FreeTextEntry1] : Ms. GUADALUPE SNIDER is a 66 year old female\par sent in a consult by Abbey Martinez for hypercalcemia, smoker 5 cigarettes per day \par has a Pulmonary doctor last seen last year for asthma, was advised for sleep study \par \par had kidney stones twice  30yrs ago and 15yrs ago\par \par denies fractures, has had a lot of falling\par no recent DEXA due, last few years ago was ok in [place] \par \par first time she was told she has high calcium was 4yrs ago\par denies FHx hypercalcemia or kidney stones \par \par also h/o thyroid nodule FNAx 14 yrs ago indeterminate  34yrs ago benign isthmectomy in [place]  per pt when her son was few months old when this happened\par \par calcium 11.5\par iPTH 95\par per pt normal EGFR \par

## 2022-01-25 NOTE — PHYSICAL EXAM
[Alert] : alert [Well Nourished] : well nourished [No Acute Distress] : no acute distress [Well Developed] : well developed [Normal Sclera/Conjunctiva] : normal sclera/conjunctiva [EOMI] : extra ocular movement intact [No Proptosis] : no proptosis [Normal Oropharynx] : the oropharynx was normal [No Thyroid Nodules] : no palpable thyroid nodules [No Respiratory Distress] : no respiratory distress [No Accessory Muscle Use] : no accessory muscle use [Clear to Auscultation] : lungs were clear to auscultation bilaterally [Normal S1, S2] : normal S1 and S2 [Normal Rate] : heart rate was normal [Regular Rhythm] : with a regular rhythm [No Edema] : no peripheral edema [Pedal Pulses Normal] : the pedal pulses are present [Normal Bowel Sounds] : normal bowel sounds [Not Tender] : non-tender [Not Distended] : not distended [Soft] : abdomen soft [Normal Anterior Cervical Nodes] : no anterior cervical lymphadenopathy [No Spinal Tenderness] : no spinal tenderness [Spine Straight] : spine straight [No Stigmata of Cushings Syndrome] : no stigmata of Cushings Syndrome [Normal Gait] : normal gait [Normal Strength/Tone] : muscle strength and tone were normal [No Rash] : no rash [Normal Reflexes] : deep tendon reflexes were 2+ and symmetric [No Tremors] : no tremors [Oriented x3] : oriented to person, place, and time [Acanthosis Nigricans] : no acanthosis nigricans [de-identified] : mildly enlarged thyroid on exam

## 2022-01-27 ENCOUNTER — APPOINTMENT (OUTPATIENT)
Dept: ENDOCRINOLOGY | Facility: CLINIC | Age: 67
End: 2022-01-27

## 2022-01-31 ENCOUNTER — APPOINTMENT (OUTPATIENT)
Dept: FAMILY MEDICINE | Facility: CLINIC | Age: 67
End: 2022-01-31
Payer: MEDICARE

## 2022-01-31 VITALS
BODY MASS INDEX: 39.61 KG/M2 | HEART RATE: 84 BPM | DIASTOLIC BLOOD PRESSURE: 84 MMHG | WEIGHT: 232 LBS | HEIGHT: 64 IN | SYSTOLIC BLOOD PRESSURE: 120 MMHG | OXYGEN SATURATION: 95 % | RESPIRATION RATE: 17 BRPM | TEMPERATURE: 97.9 F

## 2022-01-31 LAB — INR PPP: 5.4 RATIO

## 2022-01-31 PROCEDURE — 99213 OFFICE O/P EST LOW 20 MIN: CPT | Mod: 25

## 2022-01-31 PROCEDURE — 85610 PROTHROMBIN TIME: CPT | Mod: QW

## 2022-01-31 RX ORDER — ALBUTEROL SULFATE 90 UG/1
108 (90 BASE) AEROSOL, METERED RESPIRATORY (INHALATION) 4 TIMES DAILY
Qty: 1 | Refills: 2 | Status: ACTIVE | COMMUNITY
Start: 1900-01-01 | End: 1900-01-01

## 2022-01-31 NOTE — HISTORY OF PRESENT ILLNESS
[FreeTextEntry1] : INR check up  [de-identified] : 67 year old female with pmh of afib on coumadin 5mg presenting for INR check up. Patient has been taking coumadin 5mg daily at night. She states she has been on the same dose for many years. Denies bleeding, muscle aches, bruises or any other problems.

## 2022-02-01 LAB
CHOLEST SERPL-MCNC: 216 MG/DL
ESTIMATED AVERAGE GLUCOSE: 105 MG/DL
HBA1C MFR BLD HPLC: 5.3 %
HDLC SERPL-MCNC: 75 MG/DL
LDLC SERPL CALC-MCNC: 112 MG/DL
NONHDLC SERPL-MCNC: 141 MG/DL
TRIGL SERPL-MCNC: 148 MG/DL

## 2022-02-02 ENCOUNTER — APPOINTMENT (OUTPATIENT)
Dept: FAMILY MEDICINE | Facility: CLINIC | Age: 67
End: 2022-02-02
Payer: MEDICARE

## 2022-02-02 VITALS
DIASTOLIC BLOOD PRESSURE: 70 MMHG | HEART RATE: 66 BPM | SYSTOLIC BLOOD PRESSURE: 122 MMHG | HEIGHT: 64 IN | BODY MASS INDEX: 39.61 KG/M2 | WEIGHT: 232 LBS | OXYGEN SATURATION: 99 %

## 2022-02-02 LAB — INR PPP: 2.3 RATIO

## 2022-02-02 PROCEDURE — 99213 OFFICE O/P EST LOW 20 MIN: CPT | Mod: 25

## 2022-02-06 NOTE — HISTORY OF PRESENT ILLNESS
[FreeTextEntry1] : INR check [de-identified] : 67 year old femalewith pmh of afib on coumadin presenting for INR check up. Patient was seen here 2 days ago when her INR was 5.4 with no bleeding. coumadin was stopped for 2 days and returns today to have INR checked again. She denies bleeding, muscle aches, bruises or any other problems. \par

## 2022-02-09 NOTE — HISTORY OF PRESENT ILLNESS
[FreeTextEntry1] : This 66-year-old female is referred by Abbey Landry.  She was seen here in 2010 has since moved to [place] but is now back.  In 2010 she was seen for tachycardia, AV penelope reentrant treated with adenosine in the emergency room referred to electrophysiology.  She underwent successful ablation.\par \par About 2 years ago while visiting [place] she was found unconscious and was diagnosed with stress-induced cardiomyopathy, she was hospitalized at [facility] cardiac testing was done we have called for the records.  She was told of heart recovery.\par \par She was admitted to [facility] November 29, 2021 with a syncopal episode after becoming dyspneic rushing up a hill.  There she was seen by neurology, had a normal CAT scan.  She was seen by cardiology there was noted to be an elevation in troponin from 22- 211.  An echocardiogram showed low normal left ventricular systolic function with septal akinesis.\par \par \par She denies chest pain, dyspnea, palpitations or syncope. She wheezes from asthma. She was smoking  for 45 years, stopped in anticipation for parathyroid surgery. 2021. The surgery has been postponed, she is still not smoking.\par \par She had normal cors on cath December 2021.\par She denies chest pain, dyspnea, palpitations or syncope. She gets  her asthma: with cold air.\par \par \par \par

## 2022-02-09 NOTE — ADDENDUM
[FreeTextEntry1] : There is no evidence of active ischemia or CHF, there is no cardiac contraindication to planned procedure/surgery.\par

## 2022-02-09 NOTE — CARDIOLOGY SUMMARY
[de-identified] : 1/14/2021- sinus bradycardia ,T wave inversion v2 [de-identified] : 11/30/21- ef 50-55%, septal akinesis, mild MR [de-identified] : 12/10/2021-ef 55%, mild LVH,normal cors [de-identified] : carotid- 11/30/21- minimal plaque

## 2022-02-16 ENCOUNTER — LABORATORY RESULT (OUTPATIENT)
Age: 67
End: 2022-02-16

## 2022-02-16 ENCOUNTER — APPOINTMENT (OUTPATIENT)
Dept: ENDOCRINOLOGY | Facility: CLINIC | Age: 67
End: 2022-02-16

## 2022-02-16 ENCOUNTER — APPOINTMENT (OUTPATIENT)
Dept: FAMILY MEDICINE | Facility: CLINIC | Age: 67
End: 2022-02-16
Payer: MEDICARE

## 2022-02-16 VITALS
BODY MASS INDEX: 39.61 KG/M2 | TEMPERATURE: 98.2 F | SYSTOLIC BLOOD PRESSURE: 122 MMHG | OXYGEN SATURATION: 98 % | WEIGHT: 232 LBS | HEART RATE: 94 BPM | RESPIRATION RATE: 18 BRPM | HEIGHT: 64 IN | DIASTOLIC BLOOD PRESSURE: 78 MMHG

## 2022-02-16 LAB
BILIRUB UR QL STRIP: NORMAL
CLARITY UR: CLEAR
COLLECTION METHOD: NORMAL
GLUCOSE UR-MCNC: NORMAL
HCG UR QL: 1 EU/DL
HGB UR QL STRIP.AUTO: NORMAL
KETONES UR-MCNC: NORMAL
LEUKOCYTE ESTERASE UR QL STRIP: NORMAL
NITRITE UR QL STRIP: NORMAL
PH UR STRIP: 6
PROT UR STRIP-MCNC: NORMAL
SP GR UR STRIP: >=1.03

## 2022-02-16 PROCEDURE — 81003 URINALYSIS AUTO W/O SCOPE: CPT | Mod: QW

## 2022-02-16 PROCEDURE — 93000 ELECTROCARDIOGRAM COMPLETE: CPT

## 2022-02-16 PROCEDURE — 99214 OFFICE O/P EST MOD 30 MIN: CPT | Mod: 25

## 2022-02-22 LAB
ABO + RH PNL BLD: NORMAL
ALBUMIN SERPL ELPH-MCNC: 4.6 G/DL
ALP BLD-CCNC: 90 U/L
ALT SERPL-CCNC: 19 U/L
ANION GAP SERPL CALC-SCNC: 12 MMOL/L
APPEARANCE: CLEAR
APTT BLD: 41.5 SEC
AST SERPL-CCNC: 19 U/L
BASOPHILS # BLD AUTO: 0.03 K/UL
BASOPHILS NFR BLD AUTO: 0.5 %
BILIRUB SERPL-MCNC: 0.2 MG/DL
BILIRUBIN URINE: NEGATIVE
BLOOD URINE: NEGATIVE
BUN SERPL-MCNC: 16 MG/DL
CALCIUM SERPL-MCNC: 10.4 MG/DL
CHLORIDE SERPL-SCNC: 103 MMOL/L
CO2 SERPL-SCNC: 24 MMOL/L
COLOR: YELLOW
CREAT SERPL-MCNC: 0.83 MG/DL
EOSINOPHIL # BLD AUTO: 0.12 K/UL
EOSINOPHIL NFR BLD AUTO: 2.2 %
GLUCOSE QUALITATIVE U: NEGATIVE
GLUCOSE SERPL-MCNC: 112 MG/DL
HCT VFR BLD CALC: 44.2 %
HGB BLD-MCNC: 14.2 G/DL
IMM GRANULOCYTES NFR BLD AUTO: 0.2 %
INR PPP: 2.14 RATIO
KETONES URINE: NEGATIVE
LEUKOCYTE ESTERASE URINE: ABNORMAL
LYMPHOCYTES # BLD AUTO: 1.72 K/UL
LYMPHOCYTES NFR BLD AUTO: 31.1 %
MAN DIFF?: NORMAL
MCHC RBC-ENTMCNC: 30.3 PG
MCHC RBC-ENTMCNC: 32.1 GM/DL
MCV RBC AUTO: 94.4 FL
MONOCYTES # BLD AUTO: 0.49 K/UL
MONOCYTES NFR BLD AUTO: 8.9 %
NEUTROPHILS # BLD AUTO: 3.16 K/UL
NEUTROPHILS NFR BLD AUTO: 57.1 %
NITRITE URINE: NEGATIVE
PH URINE: 6
PLATELET # BLD AUTO: 171 K/UL
POTASSIUM SERPL-SCNC: 4.9 MMOL/L
PROT SERPL-MCNC: 7 G/DL
PROTEIN URINE: NORMAL
PT BLD: 24.4 SEC
RBC # BLD: 4.68 M/UL
RBC # FLD: 13.5 %
SODIUM SERPL-SCNC: 139 MMOL/L
SPECIFIC GRAVITY URINE: 1.02
UROBILINOGEN URINE: ABNORMAL
WBC # FLD AUTO: 5.53 K/UL

## 2022-02-22 NOTE — HISTORY OF PRESENT ILLNESS
[Atrial Fibrillation] : atrial fibrillation [Coronary Artery Disease] : coronary artery disease [Asthma] : asthma [COPD] : COPD [Smoker] : smoker [Chronic Anticoagulation] : chronic anticoagulation [(Patient denies any chest pain, claudication, dyspnea on exertion, orthopnea, palpitations or syncope)] : Patient denies any chest pain, claudication, dyspnea on exertion, orthopnea, palpitations or syncope [Moderate (4-6 METs)] : Moderate (4-6 METs) [Aortic Stenosis] : no aortic stenosis [Recent Myocardial Infarction] : no recent myocardial infarction [Implantable Device/Pacemaker] : no implantable device/pacemaker [Sleep Apnea] : no sleep apnea [Family Member] : no family member with adverse anesthesia reaction/sudden death [Self] : no previous adverse anesthesia reaction [Chronic Kidney Disease] : no chronic kidney disease [Diabetes] : no diabetes [FreeTextEntry1] : Parathyroidectomy [FreeTextEntry2] : 03/02/2022 [FreeTextEntry3] : Dr. David Ochoa [FreeTextEntry4] : 67 year old female with PMH of HTN (on valsartan 160mg), HLD ([id number]), Afib (Carvedilol 12.5mg, Coumadin 5mg), Asthma/COPD (advair, albtueril), anxiety (venlafaxine, alprazolam) presenting for preop clearance for parathyroidectomy on 03/02/2022. Patient had multiple surgeries in the past without any anesthesia reaction or bleeding complications. She is on warfarin 5mg daily and last INR 2 weeks ago was 2.3 She states she now occasionally smokes. Patient complaints of pain on her left flank area which she believes she pulled while cleaning the bathroom has been taking tyhlenol and using a patch. Patient denies chest pain, shortness of breath, palpitation or any other symptoms now.  [FreeTextEntry7] : EC2021- sinus bradycardia ,T wave inversion v2 \par Echo: 21- ef 50-55%, septal akinesis, mild MR \par Cardiac Cath/PCI: 12/10/2021-ef 55%, mild LVH,normal cors \par Carotid/Aorta/Peripheral Vascular: carotid- 21- minimal plaque \par

## 2022-02-23 ENCOUNTER — RESULT REVIEW (OUTPATIENT)
Age: 67
End: 2022-02-23

## 2022-03-11 ENCOUNTER — APPOINTMENT (OUTPATIENT)
Dept: ENDOCRINOLOGY | Facility: CLINIC | Age: 67
End: 2022-03-11
Payer: MEDICARE

## 2022-03-11 VITALS — BODY MASS INDEX: 40.12 KG/M2 | HEIGHT: 64 IN | WEIGHT: 235 LBS

## 2022-03-11 PROCEDURE — 99215 OFFICE O/P EST HI 40 MIN: CPT | Mod: 95

## 2022-03-11 NOTE — PHYSICAL EXAM

## 2022-03-15 ENCOUNTER — NON-APPOINTMENT (OUTPATIENT)
Age: 67
End: 2022-03-15

## 2022-03-18 ENCOUNTER — NON-APPOINTMENT (OUTPATIENT)
Age: 67
End: 2022-03-18

## 2022-03-22 ENCOUNTER — NON-APPOINTMENT (OUTPATIENT)
Age: 67
End: 2022-03-22

## 2022-04-18 ENCOUNTER — RX RENEWAL (OUTPATIENT)
Age: 67
End: 2022-04-18

## 2022-04-20 ENCOUNTER — LABORATORY RESULT (OUTPATIENT)
Age: 67
End: 2022-04-20

## 2022-04-20 ENCOUNTER — APPOINTMENT (OUTPATIENT)
Dept: FAMILY MEDICINE | Facility: CLINIC | Age: 67
End: 2022-04-20
Payer: MEDICARE

## 2022-04-20 LAB — INR PPP: 1.5 RATIO

## 2022-04-20 PROCEDURE — 85610 PROTHROMBIN TIME: CPT | Mod: QW

## 2022-04-20 PROCEDURE — 99213 OFFICE O/P EST LOW 20 MIN: CPT | Mod: 25

## 2022-04-22 NOTE — CONSULT LETTER
[Dear  ___] : Dear ~ANTOLIN, [FreeTextEntry3] : Sincerely,\par \par TOMASZ BOND MD\par [facility]\par [facility]\par

## 2022-04-22 NOTE — REASON FOR VISIT
[Follow - Up] : a follow-up visit [Hyperparathyroidism] : hyperparathyroidism [Other___] : [unfilled] [FreeTextEntry2] : Abbey Martinez, NP

## 2022-04-22 NOTE — HISTORY OF PRESENT ILLNESS
[Home] : at home, [unfilled] , at the time of the visit. [Medical Office: ([facility])___] : at the medical office located in  [Verbal consent obtained from patient] : the patient, [unfilled] [FreeTextEntry1] : Ms. GUADALUPE SNIDER is a 67 year old female\par initially sent in a consult by Abbey Martinez for hypercalcemia, smoker 5 cigarettes per day \par \par parathyroid surgery s/p parathyroidectomy 3/1/22 \par \par has a Pulmonary doctor last seen last year for asthma, was advised for sleep study \par upset as she just found out that Dr Fish does not take her insurance \par \par had kidney stones twice  30yrs ago and 15yrs ago\par had syncope while collecting the 24hr urine Syncope likely vasovagal vs undetected arrhythmia per discharge summary , patient refused to repeat the test as also has a copay that she does not want to pay \par \par denies fractures, has had a lot of falling\par no recent DEXA due, last few years ago was ok in [place] \par \par first time she was told she has high calcium was 4yrs ago\par denies FHx hypercalcemia or kidney stones \par \par also h/o thyroid nodule FNAx 14 yrs ago indeterminate  34yrs ago benign isthmectomy in [place]  per pt when her son was few months old when this happened\par \par calcium 11.5\par iPTH 95\par per pt normal EGFR \par

## 2022-04-25 NOTE — HISTORY OF PRESENT ILLNESS
[FreeTextEntry1] : INR follow up  [de-identified] : 67 year old female with PMH of HTN (on valsartan 160mg), HLD ([id number]), Afib (Carvedilol 12.5mg, Coumadin 5mg), Asthma/COPD (advair, albtueril), anxiety (venlafaxine, alprazolam) presenting for INR check. Patient had parathyroidectomy on 03/02/22 which went well. She resumed coumadin after the surgery. She had a tooth extracted 1 day ago with out any complication. Patient is doing well and has no other complaints.

## 2022-04-27 ENCOUNTER — NON-APPOINTMENT (OUTPATIENT)
Age: 67
End: 2022-04-27

## 2022-05-19 ENCOUNTER — NON-APPOINTMENT (OUTPATIENT)
Age: 67
End: 2022-05-19

## 2022-06-02 ENCOUNTER — APPOINTMENT (OUTPATIENT)
Dept: CARDIOLOGY | Facility: CLINIC | Age: 67
End: 2022-06-02

## 2022-06-27 ENCOUNTER — RX RENEWAL (OUTPATIENT)
Age: 67
End: 2022-06-27

## 2022-06-28 ENCOUNTER — NON-APPOINTMENT (OUTPATIENT)
Age: 67
End: 2022-06-28

## 2022-08-12 ENCOUNTER — APPOINTMENT (OUTPATIENT)
Dept: FAMILY MEDICINE | Facility: CLINIC | Age: 67
End: 2022-08-12

## 2022-08-12 ENCOUNTER — APPOINTMENT (OUTPATIENT)
Dept: ENDOCRINOLOGY | Facility: CLINIC | Age: 67
End: 2022-08-12

## 2022-08-12 VITALS
OXYGEN SATURATION: 96 % | SYSTOLIC BLOOD PRESSURE: 118 MMHG | HEIGHT: 64 IN | WEIGHT: 243 LBS | DIASTOLIC BLOOD PRESSURE: 82 MMHG | HEART RATE: 64 BPM | BODY MASS INDEX: 41.48 KG/M2

## 2022-08-12 VITALS
RESPIRATION RATE: 18 BRPM | SYSTOLIC BLOOD PRESSURE: 120 MMHG | HEART RATE: 59 BPM | DIASTOLIC BLOOD PRESSURE: 80 MMHG | TEMPERATURE: 98.2 F | OXYGEN SATURATION: 95 %

## 2022-08-12 DIAGNOSIS — Z86.39 PERSONAL HISTORY OF OTHER ENDOCRINE, NUTRITIONAL AND METABOLIC DISEASE: ICD-10-CM

## 2022-08-12 LAB
24R-OH-CALCIDIOL SERPL-MCNC: 47.3 PG/ML
25(OH)D3 SERPL-MCNC: 24.6 NG/ML
ALBUMIN SERPL ELPH-MCNC: 4.5 G/DL
ALP BLD-CCNC: 71 U/L
ANION GAP SERPL CALC-SCNC: 11 MMOL/L
BUN SERPL-MCNC: 10 MG/DL
CA-I SERPL-SCNC: 4.8 MG/DL
CALCIUM SERPL-MCNC: 9.1 MG/DL
CALCIUM SERPL-MCNC: 9.1 MG/DL
CHLORIDE SERPL-SCNC: 102 MMOL/L
CO2 SERPL-SCNC: 25 MMOL/L
CREAT SERPL-MCNC: 0.54 MG/DL
EGFR: 100 ML/MIN/1.73M2
GLUCOSE SERPL-MCNC: 105 MG/DL
INR PPP: 2.2 RATIO
MAGNESIUM SERPL-MCNC: 1.9 MG/DL
PARATHYROID HORMONE INTACT: 46 PG/ML
PHOSPHATE SERPL-MCNC: 2.9 MG/DL
POTASSIUM SERPL-SCNC: 4.4 MMOL/L
SODIUM SERPL-SCNC: 139 MMOL/L
T4 FREE SERPL-MCNC: 1 NG/DL
THYROGLOB AB SERPL-ACNC: <20 IU/ML
THYROPEROXIDASE AB SERPL IA-ACNC: <10 IU/ML
TSH SERPL-ACNC: 1.38 UIU/ML

## 2022-08-12 PROCEDURE — 99215 OFFICE O/P EST HI 40 MIN: CPT | Mod: 25

## 2022-08-12 PROCEDURE — 85610 PROTHROMBIN TIME: CPT | Mod: QW

## 2022-08-12 PROCEDURE — G0447 BEHAVIOR COUNSEL OBESITY 15M: CPT | Mod: 59

## 2022-08-12 PROCEDURE — 99214 OFFICE O/P EST MOD 30 MIN: CPT | Mod: 25

## 2022-08-12 RX ORDER — CARVEDILOL 6.25 MG/1
6.25 TABLET, FILM COATED ORAL
Qty: 180 | Refills: 0 | Status: DISCONTINUED | COMMUNITY
Start: 2021-12-12 | End: 2022-08-12

## 2022-08-12 RX ORDER — SULFAMETHOXAZOLE AND TRIMETHOPRIM 800; 160 MG/1; MG/1
800-160 TABLET ORAL
Qty: 20 | Refills: 0 | Status: DISCONTINUED | COMMUNITY
Start: 2021-08-30 | End: 2022-08-12

## 2022-08-12 NOTE — HISTORY OF PRESENT ILLNESS
[Home] : at home, [unfilled] , at the time of the visit. [Medical Office: ([facility])___] : at the medical office located in  [Verbal consent obtained from patient] : the patient, [unfilled] [FreeTextEntry1] : Ms. GUADALUPE SNIDER is a 67 year old female\par initially sent in a consult by Abbey Martinez for hypercalcemia, smoker 5 cigarettes per day \par parathyroid surgery March 1st 2022 Dr Ochoa [facility] Left Superior, Right inferior right superior parathyroid removed \par \par parathyroid surgery s/p parathyroidectomy 3/1/22 \par \par has a Pulmonary doctor last seen last year for asthma, was advised for sleep study \par \par \par had kidney stones twice  30yrs ago and 15yrs ago\par had syncope while collecting the 24hr urine Syncope likely vasovagal vs undetected arrhythmia per discharge summary , patient refused to repeat the test as also has a copay that she does not want to pay \par \par denies fractures, has had a lot of falling\par no recent DEXA due, last few years ago was ok in [place] \par \par first time she was told she has high calcium was 4yrs ago\par denies FHx hypercalcemia or kidney stones \par \par also h/o thyroid nodule FNAx 14 yrs ago indeterminate  34yrs ago benign isthmectomy in [place]  per pt when her son was few months old when this happened\par \par calcium 11.5\par iPTH 95\par per pt normal EGFR \par

## 2022-08-12 NOTE — CONSULT LETTER
[Dear  ___] : Dear ~ANTOLIN, [FreeTextEntry3] : Sincerely,\par \par OTMASZ OBND MD\par [facility]\par [facility]\par

## 2022-08-17 NOTE — HISTORY OF PRESENT ILLNESS
[FreeTextEntry1] : Follow up  [de-identified] : 67 year old female with PMH of HTN (on valsartan 160mg), HLD ([id number]), Afib (Carvedilol 12.5mg, Coumadin 5mg), Asthma/COPD (advair, albtueril), anxiety (venlafaxine, alprazolam) presenting for INR check. Last INR in 06/28 was 1.6 and patient was switched to coumadin 5mg M-F and Coumadin 7.5mg S-Sun. She reports compliance.

## 2022-08-29 ENCOUNTER — APPOINTMENT (OUTPATIENT)
Dept: INTERNAL MEDICINE | Facility: CLINIC | Age: 67
End: 2022-08-29

## 2022-09-12 ENCOUNTER — APPOINTMENT (OUTPATIENT)
Dept: FAMILY MEDICINE | Facility: CLINIC | Age: 67
End: 2022-09-12

## 2022-09-12 VITALS
HEART RATE: 61 BPM | SYSTOLIC BLOOD PRESSURE: 100 MMHG | WEIGHT: 240 LBS | RESPIRATION RATE: 17 BRPM | TEMPERATURE: 98.4 F | DIASTOLIC BLOOD PRESSURE: 70 MMHG | OXYGEN SATURATION: 95 % | BODY MASS INDEX: 41.2 KG/M2

## 2022-09-12 LAB — INR PPP: 3.6 RATIO

## 2022-09-12 PROCEDURE — 85610 PROTHROMBIN TIME: CPT | Mod: QW

## 2022-09-14 NOTE — HISTORY OF PRESENT ILLNESS
[FreeTextEntry1] : Follow up  [de-identified] : 67 year old female with PMH of HTN (on valsartan 160mg), HLD ([id number]), Afib (Carvedilol 12.5mg, Coumadin 5mg), Asthma/COPD (advair, albtueril), anxiety (venlafaxine, alprazolam) presenting for INR check. \par INR on 8/12/22 was 2.2 and patient was on coumadin 5mg M-F and Coumadin 7.5mg S-Sun. She reports compliance.

## 2022-10-17 ENCOUNTER — APPOINTMENT (OUTPATIENT)
Dept: FAMILY MEDICINE | Facility: CLINIC | Age: 67
End: 2022-10-17

## 2022-10-17 VITALS
RESPIRATION RATE: 17 BRPM | TEMPERATURE: 98.4 F | SYSTOLIC BLOOD PRESSURE: 150 MMHG | HEIGHT: 64 IN | DIASTOLIC BLOOD PRESSURE: 80 MMHG | BODY MASS INDEX: 40.12 KG/M2 | HEART RATE: 60 BPM | OXYGEN SATURATION: 94 % | WEIGHT: 235 LBS

## 2022-10-17 DIAGNOSIS — K62.5 HEMORRHAGE OF ANUS AND RECTUM: ICD-10-CM

## 2022-10-17 PROCEDURE — 36415 COLL VENOUS BLD VENIPUNCTURE: CPT

## 2022-10-17 PROCEDURE — 85610 PROTHROMBIN TIME: CPT | Mod: QW

## 2022-10-17 PROCEDURE — 99213 OFFICE O/P EST LOW 20 MIN: CPT | Mod: 25

## 2022-10-18 ENCOUNTER — LABORATORY RESULT (OUTPATIENT)
Age: 67
End: 2022-10-18

## 2022-10-18 LAB
BASOPHILS # BLD AUTO: 0.02 K/UL
BASOPHILS NFR BLD AUTO: 0.3 %
EOSINOPHIL # BLD AUTO: 0.09 K/UL
EOSINOPHIL NFR BLD AUTO: 1.3 %
HCT VFR BLD CALC: 42.7 %
HGB BLD-MCNC: 13.9 G/DL
IMM GRANULOCYTES NFR BLD AUTO: 0.1 %
INR PPP: 1.22 RATIO
INR PPP: 1.3 RATIO
LYMPHOCYTES # BLD AUTO: 1.99 K/UL
LYMPHOCYTES NFR BLD AUTO: 28.7 %
MAN DIFF?: NORMAL
MCHC RBC-ENTMCNC: 30.3 PG
MCHC RBC-ENTMCNC: 32.6 GM/DL
MCV RBC AUTO: 93.2 FL
MONOCYTES # BLD AUTO: 0.79 K/UL
MONOCYTES NFR BLD AUTO: 11.4 %
NEUTROPHILS # BLD AUTO: 4.04 K/UL
NEUTROPHILS NFR BLD AUTO: 58.2 %
PLATELET # BLD AUTO: 192 K/UL
POCT-PROTHROMBIN TIME: 15.2 SECS
PT BLD: 14.3 SEC
RBC # BLD: 4.58 M/UL
RBC # FLD: 13.3 %
WBC # FLD AUTO: 6.94 K/UL

## 2022-10-19 PROBLEM — K62.5 RECTAL BLEED: Status: ACTIVE | Noted: 2022-10-17

## 2022-10-19 NOTE — HISTORY OF PRESENT ILLNESS
[FreeTextEntry1] : INR follow up  [de-identified] : 67 year old female with PMH of HTN (on valsartan 160mg), HLD ([id number]), Afib (Carvedilol 12.5mg, Coumadin 5mg), Asthma/COPD (advair, albtueril), anxiety (venlafaxine, alprazolam), history of hemorrhoids presenting for INR check. \par INR on 09/12/22 was 3.6 and patient has been on coumadin 5mg daily. She reports 3 days ago she noticed rectal bleeding on wiping so stopped coumadin for 3 days. Did not notice any blood in the stool. Realizes she has hemorrhoids but wasn't sure if it was due to hemorrhoid. no blood on the tissue today.

## 2022-10-24 ENCOUNTER — APPOINTMENT (OUTPATIENT)
Dept: FAMILY MEDICINE | Facility: CLINIC | Age: 67
End: 2022-10-24

## 2022-10-24 VITALS
SYSTOLIC BLOOD PRESSURE: 130 MMHG | HEART RATE: 67 BPM | TEMPERATURE: 96.9 F | WEIGHT: 235 LBS | DIASTOLIC BLOOD PRESSURE: 80 MMHG | HEIGHT: 64 IN | BODY MASS INDEX: 40.12 KG/M2 | OXYGEN SATURATION: 95 %

## 2022-10-24 LAB
INR PPP: 2.4 RATIO
POCT-PROTHROMBIN TIME: 29 SECS

## 2022-10-24 PROCEDURE — 85610 PROTHROMBIN TIME: CPT | Mod: QW

## 2022-10-24 PROCEDURE — 99213 OFFICE O/P EST LOW 20 MIN: CPT | Mod: 25

## 2022-11-08 ENCOUNTER — RX RENEWAL (OUTPATIENT)
Age: 67
End: 2022-11-08

## 2022-11-16 ENCOUNTER — NON-APPOINTMENT (OUTPATIENT)
Age: 67
End: 2022-11-16

## 2022-11-29 ENCOUNTER — APPOINTMENT (OUTPATIENT)
Dept: FAMILY MEDICINE | Facility: CLINIC | Age: 67
End: 2022-11-29

## 2022-11-29 VITALS
TEMPERATURE: 97 F | DIASTOLIC BLOOD PRESSURE: 80 MMHG | SYSTOLIC BLOOD PRESSURE: 140 MMHG | OXYGEN SATURATION: 95 % | BODY MASS INDEX: 39.61 KG/M2 | HEIGHT: 64 IN | WEIGHT: 232 LBS | HEART RATE: 70 BPM

## 2022-11-29 LAB
INR PPP: 1.4 RATIO
POCT-PROTHROMBIN TIME: 16.7 SECS
QUALITY CONTROL: YES

## 2022-11-29 PROCEDURE — 85610 PROTHROMBIN TIME: CPT | Mod: QW

## 2022-11-29 PROCEDURE — 99213 OFFICE O/P EST LOW 20 MIN: CPT | Mod: 25

## 2022-11-29 NOTE — HISTORY OF PRESENT ILLNESS
[FreeTextEntry1] : INR check [de-identified] : 67 year old female with PMH of HTN (on valsartan 160mg), HLD ([id number]), Afib (Carvedilol 12.5mg, Coumadin 5mg), Asthma/COPD (advair, albtueril), anxiety (venlafaxine, alprazolam), presenting for INR check. Patient is on coumadin 5mg daily.

## 2022-12-10 ENCOUNTER — RX RENEWAL (OUTPATIENT)
Age: 67
End: 2022-12-10

## 2022-12-18 ENCOUNTER — RX RENEWAL (OUTPATIENT)
Age: 67
End: 2022-12-18

## 2022-12-20 ENCOUNTER — APPOINTMENT (OUTPATIENT)
Dept: FAMILY MEDICINE | Facility: CLINIC | Age: 67
End: 2022-12-20

## 2022-12-20 VITALS
OXYGEN SATURATION: 97 % | TEMPERATURE: 97 F | HEART RATE: 70 BPM | BODY MASS INDEX: 39.61 KG/M2 | HEIGHT: 64 IN | WEIGHT: 232 LBS | DIASTOLIC BLOOD PRESSURE: 70 MMHG | SYSTOLIC BLOOD PRESSURE: 120 MMHG

## 2022-12-20 LAB
INR PPP: 4 RATIO
POCT-PROTHROMBIN TIME: 47.6 SECS
QUALITY CONTROL: YES

## 2022-12-20 PROCEDURE — 85610 PROTHROMBIN TIME: CPT | Mod: QW

## 2022-12-20 PROCEDURE — 99213 OFFICE O/P EST LOW 20 MIN: CPT | Mod: 25

## 2022-12-20 NOTE — HISTORY OF PRESENT ILLNESS
[FreeTextEntry1] : Follow up for INR [de-identified] : 67 year old female with PMH of HTN (on valsartan 160mg), HLD ([id number]), Afib (Carvedilol 12.5mg, Coumadin 5mg), Asthma/COPD (advair, albtueril), anxiety (venlafaxine, alprazolam), presenting for INR check. Patient is on coumadin 5mg for 5 days and 7.5mg on the weekend. She reports taking coumadin during the day.

## 2023-01-06 ENCOUNTER — LABORATORY RESULT (OUTPATIENT)
Age: 68
End: 2023-01-06

## 2023-01-08 LAB
BASOPHILS # BLD AUTO: 0.02 K/UL
BASOPHILS NFR BLD AUTO: 0.3 %
CHOLEST SERPL-MCNC: 219 MG/DL
EOSINOPHIL # BLD AUTO: 0.09 K/UL
EOSINOPHIL NFR BLD AUTO: 1.5 %
ESTIMATED AVERAGE GLUCOSE: 105 MG/DL
HBA1C MFR BLD HPLC: 5.3 %
HCT VFR BLD CALC: 42.1 %
HDLC SERPL-MCNC: 75 MG/DL
HGB BLD-MCNC: 13.8 G/DL
IMM GRANULOCYTES NFR BLD AUTO: 0.3 %
LDLC SERPL CALC-MCNC: 113 MG/DL
LYMPHOCYTES # BLD AUTO: 1.71 K/UL
LYMPHOCYTES NFR BLD AUTO: 28.6 %
MAN DIFF?: NORMAL
MCHC RBC-ENTMCNC: 31.3 PG
MCHC RBC-ENTMCNC: 32.8 GM/DL
MCV RBC AUTO: 95.5 FL
MONOCYTES # BLD AUTO: 0.49 K/UL
MONOCYTES NFR BLD AUTO: 8.2 %
NEUTROPHILS # BLD AUTO: 3.65 K/UL
NEUTROPHILS NFR BLD AUTO: 61.1 %
NONHDLC SERPL-MCNC: 144 MG/DL
PLATELET # BLD AUTO: 172 K/UL
RBC # BLD: 4.41 M/UL
RBC # FLD: 13.6 %
TRIGL SERPL-MCNC: 155 MG/DL
WBC # FLD AUTO: 5.98 K/UL

## 2023-01-09 ENCOUNTER — APPOINTMENT (OUTPATIENT)
Dept: FAMILY MEDICINE | Facility: CLINIC | Age: 68
End: 2023-01-09

## 2023-01-13 ENCOUNTER — APPOINTMENT (OUTPATIENT)
Dept: ENDOCRINOLOGY | Facility: CLINIC | Age: 68
End: 2023-01-13
Payer: MEDICARE

## 2023-01-13 VITALS — WEIGHT: 232 LBS | BODY MASS INDEX: 39.82 KG/M2

## 2023-01-13 DIAGNOSIS — E89.2 POSTPROCEDURAL HYPOPARATHYROIDISM: ICD-10-CM

## 2023-01-13 DIAGNOSIS — Z86.39 PERSONAL HISTORY OF OTHER ENDOCRINE, NUTRITIONAL AND METABOLIC DISEASE: ICD-10-CM

## 2023-01-13 DIAGNOSIS — E04.2 NONTOXIC MULTINODULAR GOITER: ICD-10-CM

## 2023-01-13 PROCEDURE — 99215 OFFICE O/P EST HI 40 MIN: CPT | Mod: 25,95

## 2023-01-13 PROCEDURE — G0447 BEHAVIOR COUNSEL OBESITY 15M: CPT | Mod: 59,95

## 2023-01-17 ENCOUNTER — RX RENEWAL (OUTPATIENT)
Age: 68
End: 2023-01-17

## 2023-01-29 PROBLEM — E04.2 MULTINODULAR GOITER (NONTOXIC): Status: ACTIVE | Noted: 2021-11-05

## 2023-01-29 PROBLEM — E89.2 STATUS POST SUBTOTAL PARATHYROIDECTOMY: Status: ACTIVE | Noted: 2022-08-12

## 2023-01-29 PROBLEM — Z86.39 HISTORY OF HYPERCALCEMIA: Status: RESOLVED | Noted: 2021-11-05 | Resolved: 2022-08-12

## 2023-01-29 NOTE — HISTORY OF PRESENT ILLNESS
[Home] : at home, [unfilled] , at the time of the visit. [Medical Office: ([facility])___] : at the medical office located in  [Verbal consent obtained from patient] : the patient, [unfilled] [FreeTextEntry1] : Ms. GUADALUPE SNIDER is a 67 year old female\par initially sent in a consult by Abbey Martinez for hypercalcemia, smoker 5 cigarettes per day \par parathyroid surgery March 1st 2022 Dr Ochoa [facility] Left Superior, Right inferior right superior parathyroid removed \par \par parathyroid surgery s/p parathyroidectomy 3/1/22 \par \par has a Pulmonary doctor last seen last year for asthma, was advised for sleep study \par \par \par had kidney stones twice  30yrs ago and 15yrs ago\par had syncope while collecting the 24hr urine Syncope likely vasovagal vs undetected arrhythmia per discharge summary , patient refused to repeat the test as also has a copay that she does not want to pay \par \par denies fractures, has had a lot of falling\par no recent DEXA due, last few years ago was ok in [place] \par \par first time she was told she has high calcium was 4yrs ago\par denies FHx hypercalcemia or kidney stones \par \par also h/o thyroid nodule FNAx 14 yrs ago indeterminate  34yrs ago benign isthmectomy in [place]  per pt when her son was few months old when this happened\par \par calcium 11.5\par iPTH 95\par per pt normal EGFR \par

## 2023-01-29 NOTE — CONSULT LETTER
[Dear  ___] : Dear ~ANTOLIN, [FreeTextEntry3] : Sincerely,\par \par TOMASZ BOND MD\par [facility]\par [facility]\par

## 2023-01-31 ENCOUNTER — APPOINTMENT (OUTPATIENT)
Dept: FAMILY MEDICINE | Facility: CLINIC | Age: 68
End: 2023-01-31

## 2023-02-03 ENCOUNTER — RX RENEWAL (OUTPATIENT)
Age: 68
End: 2023-02-03

## 2023-02-17 ENCOUNTER — RX RENEWAL (OUTPATIENT)
Age: 68
End: 2023-02-17

## 2023-02-17 RX ORDER — ERGOCALCIFEROL 1.25 MG/1
1.25 MG CAPSULE, LIQUID FILLED ORAL
Qty: 13 | Refills: 1 | Status: ACTIVE | COMMUNITY
Start: 2022-04-29 | End: 1900-01-01

## 2023-03-10 ENCOUNTER — APPOINTMENT (OUTPATIENT)
Dept: FAMILY MEDICINE | Facility: CLINIC | Age: 68
End: 2023-03-10

## 2023-03-18 ENCOUNTER — APPOINTMENT (OUTPATIENT)
Dept: FAMILY MEDICINE | Facility: CLINIC | Age: 68
End: 2023-03-18

## 2023-03-21 ENCOUNTER — NON-APPOINTMENT (OUTPATIENT)
Age: 68
End: 2023-03-21

## 2023-03-22 ENCOUNTER — NON-APPOINTMENT (OUTPATIENT)
Age: 68
End: 2023-03-22

## 2023-03-22 ENCOUNTER — APPOINTMENT (OUTPATIENT)
Dept: FAMILY MEDICINE | Facility: CLINIC | Age: 68
End: 2023-03-22
Payer: MEDICARE

## 2023-03-22 VITALS
BODY MASS INDEX: 40.12 KG/M2 | HEIGHT: 64 IN | WEIGHT: 235 LBS | DIASTOLIC BLOOD PRESSURE: 80 MMHG | OXYGEN SATURATION: 60 % | TEMPERATURE: 98 F | SYSTOLIC BLOOD PRESSURE: 120 MMHG

## 2023-03-22 DIAGNOSIS — R92.2 INCONCLUSIVE MAMMOGRAM: ICD-10-CM

## 2023-03-22 DIAGNOSIS — I47.1 SUPRAVENTRICULAR TACHYCARDIA: ICD-10-CM

## 2023-03-22 DIAGNOSIS — M75.101 UNSPECIFIED ROTATOR CUFF TEAR OR RUPTURE OF RIGHT SHOULDER, NOT SPECIFIED AS TRAUMATIC: ICD-10-CM

## 2023-03-22 DIAGNOSIS — Z12.31 ENCOUNTER FOR SCREENING MAMMOGRAM FOR MALIGNANT NEOPLASM OF BREAST: ICD-10-CM

## 2023-03-22 DIAGNOSIS — Z00.00 ENCOUNTER FOR GENERAL ADULT MEDICAL EXAMINATION W/OUT ABNORMAL FINDINGS: ICD-10-CM

## 2023-03-22 LAB
INR PPP: 2.5 RATIO
POCT-PROTHROMBIN TIME: 30.2 SECS
QUALITY CONTROL: YES

## 2023-03-22 PROCEDURE — 85610 PROTHROMBIN TIME: CPT | Mod: QW

## 2023-03-22 PROCEDURE — G0296 VISIT TO DETERM LDCT ELIG: CPT

## 2023-03-22 PROCEDURE — 36415 COLL VENOUS BLD VENIPUNCTURE: CPT

## 2023-03-22 PROCEDURE — 99397 PER PM REEVAL EST PAT 65+ YR: CPT | Mod: 25

## 2023-03-24 ENCOUNTER — RX RENEWAL (OUTPATIENT)
Age: 68
End: 2023-03-24

## 2023-03-24 PROBLEM — I47.1 AVNRT (AV NODAL RE-ENTRY TACHYCARDIA): Status: ACTIVE | Noted: 2021-12-03

## 2023-03-24 PROBLEM — Z00.00 ENCOUNTER FOR PREVENTIVE HEALTH EXAMINATION: Status: ACTIVE | Noted: 2019-12-31

## 2023-03-24 NOTE — HISTORY OF PRESENT ILLNESS
[FreeTextEntry1] : Annual  [de-identified] : 68 year old female with PMH of HTN (on valsartan 160mg), HLD ([id number]), Afib (Carvedilol 12.5mg, Coumadin 5mg), Asthma/COPD (advair, albtuerol), anxiety (venlafaxine, alprazolam), presenting for an annual physical exam, Patient reports she is doing well. She has a right rotator cuff tear and received two steroid injection, last 1 month ago.

## 2023-03-24 NOTE — ASSESSMENT
[FreeTextEntry1] : 68 year old female presented for an annual physical exam. \par routine blood work today, blood collected in the office.\par referred for mammogram, GI for colonoscopy and CT chest lung cancer screening. \par will call back with results.\par

## 2023-03-24 NOTE — HEALTH RISK ASSESSMENT
[Good] : ~his/her~  mood as  good [Yes] : Yes [4 or more  times a week (4 pts)] : 4 or more  times a week (4 points) [1 or 2 (0 pts)] : 1 or 2 (0 points) [Never (0 pts)] : Never (0 points) [No] : In the past 12 months have you used drugs other than those required for medical reasons? No [0] : 2) Feeling down, depressed, or hopeless: Not at all (0) [PHQ-2 Negative - No further assessment needed] : PHQ-2 Negative - No further assessment needed [Patient reported mammogram was normal] : Patient reported mammogram was normal [Patient reported PAP Smear was normal] : Patient reported PAP Smear was normal [Patient reported bone density results were normal] : Patient reported bone density results were normal [Patient reported colonoscopy was normal] : Patient reported colonoscopy was normal [# of Members in Household ___] :  household currently consist of [unfilled] member(s) [Retired] : retired [Single] : single [Fully functional (bathing, dressing, toileting, transferring, walking, feeding)] : Fully functional (bathing, dressing, toileting, transferring, walking, feeding) [Fully functional (using the telephone, shopping, preparing meals, housekeeping, doing laundry, using] : Fully functional and needs no help or supervision to perform IADLs (using the telephone, shopping, preparing meals, housekeeping, doing laundry, using transportation, managing medications and managing finances) [Former] : Former [20 or more] : 20 or more [> 15 Years] : > 15 Years [No falls in past year] : Patient reported no falls in the past year [Audit-CScore] : 1 [de-identified] : None [de-identified] : None [GTA3Vapvo] : 0 [None] : None [Reports changes in hearing] : Reports no changes in hearing [Reports changes in vision] : Reports no changes in vision [Reports changes in dental health] : Reports no changes in dental health [MammogramDate] : 09/21 [PapSmearDate] : 06/16 [BoneDensityDate] : 11/21 [ColonoscopyDate] : 01/13 [ColonoscopyComments] : Lee Dr Barron; Referred  [de-identified] : 3 packs per week

## 2023-03-29 ENCOUNTER — APPOINTMENT (OUTPATIENT)
Dept: THORACIC SURGERY | Facility: CLINIC | Age: 68
End: 2023-03-29
Payer: MEDICARE

## 2023-03-29 VITALS — HEIGHT: 64 IN | WEIGHT: 240 LBS | BODY MASS INDEX: 40.97 KG/M2

## 2023-03-29 DIAGNOSIS — Z87.891 PERSONAL HISTORY OF NICOTINE DEPENDENCE: ICD-10-CM

## 2023-03-29 PROCEDURE — G0296 VISIT TO DETERM LDCT ELIG: CPT

## 2023-03-29 NOTE — DATA REVIEWED
[Lung Cancer Screening] : Patient underwent lung cancer screening [2] : 2 [TextBox_12] : 09/21 [TextBox_52] : 1 [TextBox_57] : [facility] LDCT from [place] 9/2021. Report scanned into [employer]

## 2023-03-29 NOTE — HISTORY OF PRESENT ILLNESS
[Former] : Former [TextBox_13] : Referred by Dr. Barron\par \par GUADALUPE SNIDER had telephonic visit for a review of eligibility and discussion of the Low dose CT lung cancer screening program. The following was reviewed and confirmed the patient meets screening eligibility criteria.\par -Age 68 year\par Smoking Status:\par -Former smoker\par Started smoking at 16 years old. Smoked 1/2 - 1 PPD for 49 years, then quit smoking 3 years ago, then smokes intermittently especially when she feels stress, then last smoked 1 year ago.\par -Number of pack years smokin\par -Number of years since quitting smokin\par -Quit year: 3/2022\par \par Ms. SNIDER  denies any signs or symptoms of lung cancer including new cough, changing cough, hemoptysis, and unintentional weight loss. \par \par Ms. SNIDER reports hx COPD/Emphysema, stress induced cardiomyopathy, Covid 19 infection 2022, symptomatic treatment, and pulmonary nodules. Had lung cancer screening in [place], [place] in 9/10/2021. Report scanned in twice. clearest version reads stable 8 mm left upper lobe nodule image# 263, Lung Rads 2. She  denies any personal history of lung cancer. Reports no lung cancer in a 1st degree relative. Reports no lung cancer in a 2nd degree relative. Reports there was asbestos abatement at [facility] when she worked there about 10 years ago. Has  exposure to 2nd hand smoke.\par  [YearQuit] : 2022 [PacksperYear] : 37

## 2023-03-29 NOTE — ASSESSMENT
[Ready] : Patient is ready for cessation intervention [Maintenance] : Maintenance: The patient has quit for more than 6 months [de-identified] : Reports confidence in maintaining abstinence even with stress. Discussed strategies to avoid stress smoking.

## 2023-03-29 NOTE — PLAN
[Smoking Cessation Guidance Provided] : Smoking cessation guidance was provided to patient [Smoking Cessation] : smoking cessation [FreeTextEntry1] : Plan:\par \par -Low dose CT chest for lung cancer screening. QUYEN GUZMAN ordered the low dose CT.      \par \par Pt will attempt to obtain copy of previous lung cancer screening from [facility] [place] [facility]\par \par -Follow up with patient and her referring provider after her LDCT results have been reviewed by the multidisciplinary clinical team, if needed.\par \par -Encourage continued smoking abstinence.\par \par Should I screen? tool utilized. 6 Year risk of lung cancer is   3.1%. \par \par Patient wishes to proceed with screening.\par \par Engaged in discussion regarding risks of screening during Covid-19 pandemic and precautions that are being used  to reduce exposure.\par \par Engaged in shared decision making with Ms. SNIDER . Answered all questions. She verbalized understanding and agreement. She knows to call back with and questions or concerns.\par

## 2023-04-05 ENCOUNTER — RESULT REVIEW (OUTPATIENT)
Age: 68
End: 2023-04-05

## 2023-04-09 ENCOUNTER — NON-APPOINTMENT (OUTPATIENT)
Age: 68
End: 2023-04-09

## 2023-04-09 DIAGNOSIS — Z12.11 ENCOUNTER FOR SCREENING FOR MALIGNANT NEOPLASM OF COLON: ICD-10-CM

## 2023-04-10 ENCOUNTER — NON-APPOINTMENT (OUTPATIENT)
Age: 68
End: 2023-04-10

## 2023-04-26 ENCOUNTER — APPOINTMENT (OUTPATIENT)
Dept: FAMILY MEDICINE | Facility: CLINIC | Age: 68
End: 2023-04-26
Payer: MEDICARE

## 2023-04-26 VITALS
SYSTOLIC BLOOD PRESSURE: 120 MMHG | TEMPERATURE: 98 F | OXYGEN SATURATION: 95 % | BODY MASS INDEX: 40.97 KG/M2 | HEIGHT: 64 IN | HEART RATE: 65 BPM | DIASTOLIC BLOOD PRESSURE: 80 MMHG | WEIGHT: 240 LBS

## 2023-04-26 LAB
INR PPP: 1.9 RATIO
POCT-PROTHROMBIN TIME: 23.3 SECS
QUALITY CONTROL: YES

## 2023-04-26 PROCEDURE — 99213 OFFICE O/P EST LOW 20 MIN: CPT | Mod: 25

## 2023-04-26 PROCEDURE — 85610 PROTHROMBIN TIME: CPT | Mod: QW

## 2023-04-26 RX ORDER — FLUTICASONE FUROATE, UMECLIDINIUM BROMIDE AND VILANTEROL TRIFENATATE 100; 62.5; 25 UG/1; UG/1; UG/1
100-62.5-25 POWDER RESPIRATORY (INHALATION)
Qty: 60 | Refills: 5 | Status: ACTIVE | COMMUNITY
Start: 2023-04-26 | End: 1900-01-01

## 2023-05-01 NOTE — HISTORY OF PRESENT ILLNESS
[FreeTextEntry1] : INR follow up  [de-identified] : 68 year old female with PMH of HTN (on valsartan 160mg), HLD ([id number]), Afib (Carvedilol 12.5mg, Coumadin 5mg), Asthma/COPD (advair, albtueril), anxiety (venlafaxine, alprazolam), presenting for INR check. Patient is on coumadin 5mg daily.

## 2023-05-04 ENCOUNTER — APPOINTMENT (OUTPATIENT)
Dept: GASTROENTEROLOGY | Facility: CLINIC | Age: 68
End: 2023-05-04
Payer: MEDICARE

## 2023-05-04 VITALS
SYSTOLIC BLOOD PRESSURE: 108 MMHG | HEART RATE: 74 BPM | WEIGHT: 240 LBS | HEIGHT: 64 IN | BODY MASS INDEX: 40.97 KG/M2 | DIASTOLIC BLOOD PRESSURE: 70 MMHG | OXYGEN SATURATION: 95 %

## 2023-05-04 DIAGNOSIS — K76.0 FATTY (CHANGE OF) LIVER, NOT ELSEWHERE CLASSIFIED: ICD-10-CM

## 2023-05-04 DIAGNOSIS — K64.8 OTHER HEMORRHOIDS: ICD-10-CM

## 2023-05-04 DIAGNOSIS — K63.5 POLYP OF COLON: ICD-10-CM

## 2023-05-04 DIAGNOSIS — Z12.11 ENCOUNTER FOR SCREENING FOR MALIGNANT NEOPLASM OF COLON: ICD-10-CM

## 2023-05-04 PROCEDURE — 99204 OFFICE O/P NEW MOD 45 MIN: CPT

## 2023-05-04 RX ORDER — MOMETASONE FUROATE AND FORMOTEROL FUMARATE DIHYDRATE 200; 5 UG/1; UG/1
200-5 AEROSOL RESPIRATORY (INHALATION)
Qty: 1 | Refills: 2 | Status: DISCONTINUED | COMMUNITY
Start: 2022-10-17 | End: 2023-05-04

## 2023-05-04 RX ORDER — ERGOCALCIFEROL 1.25 MG/1
1.25 MG CAPSULE ORAL
Qty: 13 | Refills: 1 | Status: DISCONTINUED | COMMUNITY
Start: 2022-08-12 | End: 2023-05-04

## 2023-05-04 NOTE — HISTORY OF PRESENT ILLNESS
[FreeTextEntry1] : \par \par \par This HPI reflects a summary and review of records : including previous and most recent  Labs, body imaging, consults and progress notes, operative and pathology reports, EKG reports, ED records, found in [employer], [employer],  [employer] and any additional records brought in by  the patient at the time of the visit.\par \par \par \par PCP: Barron\par \par 68 F w h/o Obesity, Pre-DM, AFIB--s/p ablation, HTN, HLD, Asthma/COPD,MNG,Allergies,Anxiety,\par Kidney stones, Vit D Defic,\par NAFLD--> Abd sobo:12/2007\par s/p ERCP w ES--> for SOD\par Colon Polyps, Hemorrhoids\par \par 5/4/23    Today:  Feeling well, no c/o , CP, SOB/ [place], Cough, Wheeze, Palpitations, edema\par   Most recent labs 2023 reviewed w patient: cbc, cmet, tsh, HgbA1c--  ,LDL--  ,Vit. D-wnl   \par \par \par 5/4/23   Today: \par \par * Abd pain-->no\par * Nausea--> no\par * Vomit--> no\par * Early satiety--> no\par * Belching--> no\par * Hiccups--> no\par * Regurgitation--> no\par * Acid Taste / Water Brash--> no\par * Ht burn--> no\par * Dysphagia--> no\par * Throat Clearing--> no\par * Hoarseness--> no\par * Post-Nasal Drip--> no\par * Congestion--> no\par * Globus--> no\par * Cough--> no\par * Wheeze / PC-> -no\par * BMs: # 4 qd\par * Constipation--> no\par * Diarrhea--> no\par * Bloating--> no\par * Strain on Defecation--> no\par * Incompl Evac--> no\par * Flatulence--> no\par * Gurgling--> no\par * Melena--> no\par * BPBPR-> -no\par * Anorexia--> no\par * Wt. Loss--> no\par \par

## 2023-05-04 NOTE — ASSESSMENT
[FreeTextEntry1] : \par \par \par \par 1.  Fatty Liver--> on Abd sono--> 12/2007\par Recommend: \par * Low Fat / Low Carbohydrate diet was reviewed and emphasized \par * avoid  alcohol and other potential hepatoxins was emphasized\par * Formal wt. reduction program & regular exercise was emphasized\par * Tight glycemic control was  emphasized  \par * Anti-oxidants: Omega-3’s  3 grams/D, Vit E 800 IU/D, Vit C 1000mg/D,  Vit D 2000 IU/D  was  emphasized\par * Monitor LFTs, FBG, HgbA1c, Lipids :    most recent labs were reviewed and diet/ treatment changes were discussed\par check Insulin level \par * Abd Sonogram--  new Rx given\par Fibroscan--> after abd sono \par \par \par \par \par \par \par 2. Hemorrhoids:  well - controlled.  No pain,  swelling,  itch,  bleeding\par * Discussed   the  potential complications of thrombosis,  pain,  infection,  swelling, itching,  bleeding --none recently\par Recommendations: \par * Moderate- High  Fiber Diet was reviewed and emphasized\par * 6  --  8 cups of decaffeinated fluid daily was emphasized \par * Sitz Bathes as needed ,  No:  Anusol HC  Suppos / Cream  VT BID -- was needed\par * No:  Tucks BID,  Balneol Lotion,   Calmoseptine Oint -- was needed ;    can use  Prep H prn\par * No:  need for  Colorectal surgical evaluation for possible ablation \par \par \par \par \par \par 3. Colorectal  Neoplasia  Screening:  to be evaluated\par   Utilizing teaching posters and anatomical models the following were discussed and emphasized with the patient in detail: \par * Discussed the pre-malignant potential of polyps\par * Discussed the importance of f/u surveillance / screening colonoscopy \par * moderate-High  Fiber Diet was reviewed and emphasized\par * Anti-oxidants and ASA/NSAID Therapy emphasized\par * Given age > 40-49 yo  & +PH Colon Polyps \par * Recommend Colonoscopy  to  R/O  Colonic Neoplasia-- in 2023\par \par \par \par \par \par Informed Consent:\par * The risks & Benefits of  Colonoscopy were discussed w patient.\par * This included but was not limited to perforation, bleeding, sedation /med rxns possibly requiring surgery, blood transfusions, antibiotics & CPR/Intubation.\par * Pt. understands & agrees to the procedures.\par The following instructions in regards to the prep and medically essential ( cardiac, pulmonary, sz, psych, endocrine)  pre-op medication administration\par was reviewed and emphasized with the patient . \par * Pt. advised to D/C  ASA/NSAIDs  7  Days  & Warfarin,  4    Days PTP.\par * [ +++ ]  Dulcolax / Miralax / Mag. Citrate ,  [     ] Prepopik/ Clenpiq ,  [     ] Osmo Prep,  [    ] GoLytely,  prep. reviewed w Pt.\par * Hold  [           ] AM of procedure.\par * Hold  [           ] PM  before procedure.\par * Take  [           ] PM  before procedure.\par * Take  [   coreg, valsartan         ] AM of procedure.\par \par

## 2023-05-04 NOTE — REVIEW OF SYSTEMS
[Scleral Icterus (Yellow Eyes)] : no scleral icterus [Skin Lesions] : no skin lesions [Confused] : no confusion [Easy Bleeding] : no tendency for easy bleeding

## 2023-05-30 ENCOUNTER — APPOINTMENT (OUTPATIENT)
Dept: FAMILY MEDICINE | Facility: CLINIC | Age: 68
End: 2023-05-30
Payer: MEDICARE

## 2023-05-30 VITALS
TEMPERATURE: 97 F | OXYGEN SATURATION: 98 % | DIASTOLIC BLOOD PRESSURE: 80 MMHG | SYSTOLIC BLOOD PRESSURE: 110 MMHG | HEIGHT: 64 IN | HEART RATE: 65 BPM | BODY MASS INDEX: 40.97 KG/M2 | WEIGHT: 240 LBS

## 2023-05-30 LAB
INR PPP: 2 RATIO
POCT-PROTHROMBIN TIME: 23.8 SECS
QUALITY CONTROL: YES

## 2023-05-30 PROCEDURE — 85610 PROTHROMBIN TIME: CPT | Mod: QW

## 2023-05-30 PROCEDURE — 99213 OFFICE O/P EST LOW 20 MIN: CPT | Mod: 25

## 2023-05-31 ENCOUNTER — RX RENEWAL (OUTPATIENT)
Age: 68
End: 2023-05-31

## 2023-05-31 ENCOUNTER — APPOINTMENT (OUTPATIENT)
Dept: PULMONOLOGY | Facility: CLINIC | Age: 68
End: 2023-05-31
Payer: MEDICARE

## 2023-05-31 VITALS
HEART RATE: 83 BPM | DIASTOLIC BLOOD PRESSURE: 68 MMHG | OXYGEN SATURATION: 96 % | HEIGHT: 63 IN | TEMPERATURE: 96.5 F | BODY MASS INDEX: 42.52 KG/M2 | WEIGHT: 240 LBS | SYSTOLIC BLOOD PRESSURE: 126 MMHG

## 2023-05-31 DIAGNOSIS — R91.1 SOLITARY PULMONARY NODULE: ICD-10-CM

## 2023-05-31 PROCEDURE — 99407 BEHAV CHNG SMOKING > 10 MIN: CPT

## 2023-05-31 PROCEDURE — 99214 OFFICE O/P EST MOD 30 MIN: CPT | Mod: 25

## 2023-05-31 NOTE — PHYSICAL EXAM
[No Acute Distress] : no acute distress [II] : Mallampati Class: II [Neck Circumference: ___] : neck circumference: [unfilled] [Normal Rate/Rhythm] : normal rate/rhythm [Normal S1, S2] : normal s1, s2 [No Resp Distress] : no resp distress [Clear to Auscultation Bilaterally] : clear to auscultation bilaterally [No Abnormalities] : no abnormalities [No Clubbing] : no clubbing [No Edema] : no edema [No Focal Deficits] : no focal deficits [Oriented x3] : oriented x3 [Normal Affect] : normal affect

## 2023-05-31 NOTE — REVIEW OF SYSTEMS
Hourly rounds completed on patient. All needs met at this time. Call light within reach. Bed wheels are locked in low position. [Seasonal Allergies] : seasonal allergies [Nocturia] : nocturia [Anxiety] : anxiety [Obesity] : obesity [Negative] : Hematologic [TextBox_30] : see HPI

## 2023-06-07 NOTE — HISTORY OF PRESENT ILLNESS
[FreeTextEntry1] : Follow up  [de-identified] : 68 year old female with PMH of HTN (on valsartan 160mg), HLD ([id number]), Afib (Carvedilol 12.5mg, Coumadin 5mg), Asthma/COPD (advair, albtueril), anxiety (venlafaxine, alprazolam), presenting for INR check. Patient is on coumadin 5mg daily.

## 2023-06-21 ENCOUNTER — APPOINTMENT (OUTPATIENT)
Dept: GASTROENTEROLOGY | Facility: HOSPITAL | Age: 68
End: 2023-06-21

## 2023-07-11 ENCOUNTER — RX RENEWAL (OUTPATIENT)
Age: 68
End: 2023-07-11

## 2023-07-17 ENCOUNTER — APPOINTMENT (OUTPATIENT)
Dept: INTERNAL MEDICINE | Facility: CLINIC | Age: 68
End: 2023-07-17
Payer: MEDICARE

## 2023-07-17 PROCEDURE — 85610 PROTHROMBIN TIME: CPT | Mod: QW

## 2023-07-18 ENCOUNTER — APPOINTMENT (OUTPATIENT)
Dept: PULMONOLOGY | Facility: CLINIC | Age: 68
End: 2023-07-18
Payer: MEDICARE

## 2023-07-18 VITALS
DIASTOLIC BLOOD PRESSURE: 68 MMHG | TEMPERATURE: 96.5 F | HEART RATE: 65 BPM | HEIGHT: 63 IN | BODY MASS INDEX: 42.52 KG/M2 | WEIGHT: 240 LBS | SYSTOLIC BLOOD PRESSURE: 126 MMHG | OXYGEN SATURATION: 96 %

## 2023-07-18 DIAGNOSIS — F17.200 NICOTINE DEPENDENCE, UNSPECIFIED, UNCOMPLICATED: ICD-10-CM

## 2023-07-18 DIAGNOSIS — J96.11 CHRONIC RESPIRATORY FAILURE WITH HYPOXIA: ICD-10-CM

## 2023-07-18 PROCEDURE — 99406 BEHAV CHNG SMOKING 3-10 MIN: CPT

## 2023-07-18 PROCEDURE — 99214 OFFICE O/P EST MOD 30 MIN: CPT | Mod: 25

## 2023-07-18 NOTE — HISTORY OF PRESENT ILLNESS
[TextBox_4] : 68 year old female with COPD and pulmonary nodules, probable CARMELITA came for f/u\par 6MWT with desaturations to 88%\par PFT's with severe airways obstruction, air trapping and severe decrease DLCO\par \par Dyspnea is the same. No hospitalizations since the last visit\par On Advair and Ventolin.\par Does not smoke anymore. She vapes now. \par Could not afford the copayment for PSG and did not have it\par Sleep schedule the same\par Has daytime somnolence\par Tests results d/w her\par \par \par \par

## 2023-07-18 NOTE — PHYSICAL EXAM
[No Acute Distress] : no acute distress [Normal Appearance] : normal appearance [Normal Rate/Rhythm] : normal rate/rhythm [No Resp Distress] : no resp distress [Clear to Auscultation Bilaterally] : clear to auscultation bilaterally [No Abnormalities] : no abnormalities [No Clubbing] : no clubbing [No Focal Deficits] : no focal deficits [Oriented x3] : oriented x3 [Normal Affect] : normal affect

## 2023-08-15 ENCOUNTER — APPOINTMENT (OUTPATIENT)
Dept: FAMILY MEDICINE | Facility: CLINIC | Age: 68
End: 2023-08-15

## 2023-08-15 ENCOUNTER — APPOINTMENT (OUTPATIENT)
Dept: OBGYN | Facility: CLINIC | Age: 68
End: 2023-08-15

## 2023-08-28 ENCOUNTER — APPOINTMENT (OUTPATIENT)
Dept: FAMILY MEDICINE | Facility: CLINIC | Age: 68
End: 2023-08-28
Payer: MEDICARE

## 2023-08-28 VITALS
HEIGHT: 63 IN | TEMPERATURE: 98.2 F | WEIGHT: 240 LBS | OXYGEN SATURATION: 98 % | SYSTOLIC BLOOD PRESSURE: 130 MMHG | DIASTOLIC BLOOD PRESSURE: 80 MMHG | HEART RATE: 67 BPM | BODY MASS INDEX: 42.52 KG/M2

## 2023-08-28 LAB — INR PPP: 1.5 RATIO

## 2023-08-28 PROCEDURE — 85610 PROTHROMBIN TIME: CPT | Mod: QW

## 2023-08-28 PROCEDURE — 99214 OFFICE O/P EST MOD 30 MIN: CPT | Mod: 25

## 2023-08-28 RX ORDER — FLUTICASONE FUROATE, UMECLIDINIUM BROMIDE AND VILANTEROL TRIFENATATE 100; 62.5; 25 UG/1; UG/1; UG/1
100-62.5-25 POWDER RESPIRATORY (INHALATION)
Qty: 1 | Refills: 3 | Status: DISCONTINUED | COMMUNITY
Start: 2023-07-18 | End: 2023-08-28

## 2023-08-28 RX ORDER — PHENYLEPHRINE HCL 10 MG
7 TABLET ORAL DAILY
Qty: 28 | Refills: 2 | Status: DISCONTINUED | COMMUNITY
Start: 2023-05-31 | End: 2023-08-28

## 2023-08-28 RX ORDER — NICOTINE 21 MG/24HR
14 PATCH, TRANSDERMAL 24 HOURS TRANSDERMAL DAILY
Qty: 14 | Refills: 0 | Status: DISCONTINUED | COMMUNITY
Start: 2023-05-31 | End: 2023-08-28

## 2023-08-28 RX ORDER — FLUTICASONE PROPIONATE AND SALMETEROL 250; 50 UG/1; UG/1
250-50 POWDER RESPIRATORY (INHALATION) TWICE DAILY
Qty: 60 | Refills: 1 | Status: DISCONTINUED | COMMUNITY
Start: 2022-11-08 | End: 2023-08-28

## 2023-08-28 NOTE — HEALTH RISK ASSESSMENT
[Yes] : Yes [Monthly or less (1 pt)] : Monthly or less (1 point) [1 or 2 (0 pts)] : 1 or 2 (0 points) [Never (0 pts)] : Never (0 points) [No falls in past year] : Patient reported no falls in the past year [0] : 2) Feeling down, depressed, or hopeless: Not at all (0) [Former] : Former [20 or more] : 20 or more [< 15 Years] : < 15 Years [[id number]] : 0

## 2023-08-28 NOTE — HISTORY OF PRESENT ILLNESS
[FreeTextEntry1] : INR [de-identified] : 67 yo F with atrial fibrillation here for INR follow up on coumadin, currently taking 5mg daily. INR today is low at 1.5.

## 2023-09-06 ENCOUNTER — APPOINTMENT (OUTPATIENT)
Dept: INTERNAL MEDICINE | Facility: CLINIC | Age: 68
End: 2023-09-06

## 2023-09-12 ENCOUNTER — APPOINTMENT (OUTPATIENT)
Dept: INTERNAL MEDICINE | Facility: CLINIC | Age: 68
End: 2023-09-12

## 2023-09-15 ENCOUNTER — RX RENEWAL (OUTPATIENT)
Age: 68
End: 2023-09-15

## 2023-09-19 ENCOUNTER — RX RENEWAL (OUTPATIENT)
Age: 68
End: 2023-09-19

## 2023-09-22 ENCOUNTER — APPOINTMENT (OUTPATIENT)
Dept: FAMILY MEDICINE | Facility: CLINIC | Age: 68
End: 2023-09-22
Payer: MEDICARE

## 2023-09-22 VITALS
HEIGHT: 63 IN | HEART RATE: 70 BPM | SYSTOLIC BLOOD PRESSURE: 120 MMHG | OXYGEN SATURATION: 98 % | DIASTOLIC BLOOD PRESSURE: 80 MMHG | BODY MASS INDEX: 42.52 KG/M2 | TEMPERATURE: 98 F | WEIGHT: 240 LBS

## 2023-09-22 LAB
INR PPP: 2.7 RATIO
POCT-PROTHROMBIN TIME: 31.9 SECS
QUALITY CONTROL: YES

## 2023-09-22 PROCEDURE — 99214 OFFICE O/P EST MOD 30 MIN: CPT | Mod: 25

## 2023-09-22 PROCEDURE — 85610 PROTHROMBIN TIME: CPT | Mod: QW

## 2023-09-25 ENCOUNTER — RX RENEWAL (OUTPATIENT)
Age: 68
End: 2023-09-25

## 2023-09-26 ENCOUNTER — RESULT REVIEW (OUTPATIENT)
Age: 68
End: 2023-09-26

## 2023-09-27 ENCOUNTER — APPOINTMENT (OUTPATIENT)
Dept: GASTROENTEROLOGY | Facility: HOSPITAL | Age: 68
End: 2023-09-27

## 2023-10-06 ENCOUNTER — APPOINTMENT (OUTPATIENT)
Dept: OBGYN | Facility: CLINIC | Age: 68
End: 2023-10-06

## 2023-10-26 ENCOUNTER — APPOINTMENT (OUTPATIENT)
Dept: ENDOCRINOLOGY | Facility: CLINIC | Age: 68
End: 2023-10-26

## 2023-10-26 ENCOUNTER — RESULT CHARGE (OUTPATIENT)
Age: 68
End: 2023-10-26

## 2023-10-27 ENCOUNTER — APPOINTMENT (OUTPATIENT)
Dept: FAMILY MEDICINE | Facility: CLINIC | Age: 68
End: 2023-10-27
Payer: MEDICARE

## 2023-10-27 VITALS
WEIGHT: 240 LBS | HEIGHT: 63 IN | DIASTOLIC BLOOD PRESSURE: 82 MMHG | HEART RATE: 63 BPM | OXYGEN SATURATION: 97 % | BODY MASS INDEX: 42.52 KG/M2 | SYSTOLIC BLOOD PRESSURE: 120 MMHG

## 2023-10-27 DIAGNOSIS — R31.9 HEMATURIA, UNSPECIFIED: ICD-10-CM

## 2023-10-27 DIAGNOSIS — N39.0 URINARY TRACT INFECTION, SITE NOT SPECIFIED: ICD-10-CM

## 2023-10-27 LAB
BILIRUB UR QL STRIP: NORMAL
CLARITY UR: CLEAR
COLLECTION METHOD: NORMAL
GLUCOSE UR-MCNC: NORMAL
HCG UR QL: 0.2 EU/DL
HGB UR QL STRIP.AUTO: NORMAL
KETONES UR-MCNC: NORMAL
LEUKOCYTE ESTERASE UR QL STRIP: NORMAL
NITRITE UR QL STRIP: NORMAL
PH UR STRIP: 6.5
PROT UR STRIP-MCNC: NORMAL
SP GR UR STRIP: 1.02

## 2023-10-27 PROCEDURE — 99214 OFFICE O/P EST MOD 30 MIN: CPT | Mod: 25

## 2023-10-27 PROCEDURE — 81003 URINALYSIS AUTO W/O SCOPE: CPT | Mod: QW

## 2023-10-30 LAB
APPEARANCE: CLEAR
BACTERIA: NEGATIVE /HPF
BILIRUBIN URINE: NEGATIVE
BLOOD URINE: ABNORMAL
CAST: 0 /LPF
COLOR: YELLOW
EPITHELIAL CELLS: 17 /HPF
GLUCOSE QUALITATIVE U: NEGATIVE MG/DL
KETONES URINE: NEGATIVE MG/DL
LEUKOCYTE ESTERASE URINE: ABNORMAL
MICROSCOPIC-UA: NORMAL
NITRITE URINE: NEGATIVE
PH URINE: 6.5
PROTEIN URINE: NEGATIVE MG/DL
RED BLOOD CELLS URINE: 1 /HPF
SPECIFIC GRAVITY URINE: 1.01
UROBILINOGEN URINE: 0.2 MG/DL
WHITE BLOOD CELLS URINE: 7 /HPF

## 2023-11-03 PROBLEM — R31.9 HEMATURIA: Status: ACTIVE | Noted: 2023-10-27

## 2023-11-03 LAB — INR PPP: 5.2 RATIO

## 2023-11-14 ENCOUNTER — APPOINTMENT (OUTPATIENT)
Dept: FAMILY MEDICINE | Facility: CLINIC | Age: 68
End: 2023-11-14
Payer: MEDICARE

## 2023-11-14 VITALS
HEART RATE: 75 BPM | BODY MASS INDEX: 40.97 KG/M2 | OXYGEN SATURATION: 96 % | DIASTOLIC BLOOD PRESSURE: 80 MMHG | HEIGHT: 64 IN | SYSTOLIC BLOOD PRESSURE: 120 MMHG | WEIGHT: 240 LBS

## 2023-11-14 LAB
INR PPP: 3 RATIO
INR PPP: 3 RATIO
POCT-PROTHROMBIN TIME: 36.5 SECS
QUALITY CONTROL: YES

## 2023-11-14 PROCEDURE — 99214 OFFICE O/P EST MOD 30 MIN: CPT | Mod: 25

## 2023-11-14 PROCEDURE — 36415 COLL VENOUS BLD VENIPUNCTURE: CPT

## 2023-11-14 PROCEDURE — 85610 PROTHROMBIN TIME: CPT | Mod: QW

## 2023-11-15 LAB
ALBUMIN SERPL ELPH-MCNC: 4.1 G/DL
ALP BLD-CCNC: 74 U/L
ALT SERPL-CCNC: 28 U/L
ANION GAP SERPL CALC-SCNC: 11 MMOL/L
AST SERPL-CCNC: 22 U/L
BILIRUB SERPL-MCNC: 0.2 MG/DL
BUN SERPL-MCNC: 11 MG/DL
CALCIUM SERPL-MCNC: 8.6 MG/DL
CHLORIDE SERPL-SCNC: 103 MMOL/L
CHOLEST SERPL-MCNC: 297 MG/DL
CO2 SERPL-SCNC: 25 MMOL/L
CREAT SERPL-MCNC: 0.71 MG/DL
EGFR: 91 ML/MIN/1.73M2
ESTIMATED AVERAGE GLUCOSE: 105 MG/DL
GLUCOSE SERPL-MCNC: 144 MG/DL
HBA1C MFR BLD HPLC: 5.3 %
HCT VFR BLD CALC: 41.8 %
HDLC SERPL-MCNC: 59 MG/DL
HGB BLD-MCNC: 13.4 G/DL
LDLC SERPL CALC-MCNC: 188 MG/DL
MCHC RBC-ENTMCNC: 31.4 PG
MCHC RBC-ENTMCNC: 32.1 GM/DL
MCV RBC AUTO: 97.9 FL
NONHDLC SERPL-MCNC: 238 MG/DL
PLATELET # BLD AUTO: 178 K/UL
POTASSIUM SERPL-SCNC: 4.1 MMOL/L
PROT SERPL-MCNC: 6.4 G/DL
RBC # BLD: 4.27 M/UL
RBC # FLD: 13.1 %
SODIUM SERPL-SCNC: 139 MMOL/L
TRIGL SERPL-MCNC: 259 MG/DL
WBC # FLD AUTO: 5.98 K/UL

## 2023-12-08 ENCOUNTER — RX RENEWAL (OUTPATIENT)
Age: 68
End: 2023-12-08

## 2023-12-08 ENCOUNTER — APPOINTMENT (OUTPATIENT)
Dept: FAMILY MEDICINE | Facility: CLINIC | Age: 68
End: 2023-12-08
Payer: MEDICARE

## 2023-12-08 VITALS
HEART RATE: 73 BPM | SYSTOLIC BLOOD PRESSURE: 132 MMHG | BODY MASS INDEX: 41.83 KG/M2 | DIASTOLIC BLOOD PRESSURE: 76 MMHG | WEIGHT: 245 LBS | OXYGEN SATURATION: 99 % | TEMPERATURE: 99.1 F | HEIGHT: 64 IN

## 2023-12-08 PROCEDURE — 99214 OFFICE O/P EST MOD 30 MIN: CPT | Mod: 25

## 2023-12-08 PROCEDURE — 85610 PROTHROMBIN TIME: CPT | Mod: QW

## 2023-12-11 ENCOUNTER — RX RENEWAL (OUTPATIENT)
Age: 68
End: 2023-12-11

## 2023-12-11 LAB — INR PPP: 2.4 RATIO

## 2023-12-11 RX ORDER — VENLAFAXINE HYDROCHLORIDE 150 MG/1
150 CAPSULE, EXTENDED RELEASE ORAL
Qty: 90 | Refills: 3 | Status: ACTIVE | COMMUNITY
Start: 1900-01-01 | End: 1900-01-01

## 2023-12-11 RX ORDER — WARFARIN 5 MG/1
5 TABLET ORAL
Qty: 100 | Refills: 3 | Status: ACTIVE | COMMUNITY
Start: 1900-01-01 | End: 1900-01-01

## 2024-01-09 ENCOUNTER — RX RENEWAL (OUTPATIENT)
Age: 69
End: 2024-01-09

## 2024-01-26 ENCOUNTER — RX RENEWAL (OUTPATIENT)
Age: 69
End: 2024-01-26

## 2024-01-26 RX ORDER — ALPRAZOLAM 0.25 MG/1
0.25 TABLET ORAL
Qty: 60 | Refills: 0 | Status: ACTIVE | COMMUNITY
Start: 2021-10-06 | End: 1900-01-01

## 2024-02-16 ENCOUNTER — APPOINTMENT (OUTPATIENT)
Dept: FAMILY MEDICINE | Facility: CLINIC | Age: 69
End: 2024-02-16

## 2024-03-07 ENCOUNTER — APPOINTMENT (OUTPATIENT)
Dept: FAMILY MEDICINE | Facility: CLINIC | Age: 69
End: 2024-03-07

## 2024-03-15 ENCOUNTER — RX RENEWAL (OUTPATIENT)
Age: 69
End: 2024-03-15

## 2024-03-15 RX ORDER — VENLAFAXINE 75 MG/1
75 TABLET ORAL
Qty: 90 | Refills: 3 | Status: ACTIVE | COMMUNITY
Start: 2021-05-23 | End: 1900-01-01

## 2024-04-02 ENCOUNTER — RX RENEWAL (OUTPATIENT)
Age: 69
End: 2024-04-02

## 2024-04-08 ENCOUNTER — APPOINTMENT (OUTPATIENT)
Dept: FAMILY MEDICINE | Facility: CLINIC | Age: 69
End: 2024-04-08
Payer: SELF-PAY

## 2024-04-08 PROCEDURE — PCNS1: CPT

## 2024-04-25 ENCOUNTER — RX RENEWAL (OUTPATIENT)
Age: 69
End: 2024-04-25

## 2024-04-26 ENCOUNTER — APPOINTMENT (OUTPATIENT)
Dept: FAMILY MEDICINE | Facility: CLINIC | Age: 69
End: 2024-04-26
Payer: MEDICARE

## 2024-04-26 VITALS
TEMPERATURE: 99.1 F | WEIGHT: 245 LBS | HEIGHT: 64 IN | DIASTOLIC BLOOD PRESSURE: 70 MMHG | SYSTOLIC BLOOD PRESSURE: 110 MMHG | BODY MASS INDEX: 41.83 KG/M2

## 2024-04-26 LAB — INR PPP: 4.2 RATIO

## 2024-04-26 PROCEDURE — 85610 PROTHROMBIN TIME: CPT | Mod: QW

## 2024-04-26 PROCEDURE — 99214 OFFICE O/P EST MOD 30 MIN: CPT

## 2024-04-26 PROCEDURE — G2211 COMPLEX E/M VISIT ADD ON: CPT

## 2024-04-26 RX ORDER — NICOTINE 4 MG/1
10 INHALANT RESPIRATORY (INHALATION)
Qty: 1 | Refills: 1 | Status: DISCONTINUED | COMMUNITY
Start: 2023-05-31 | End: 2024-04-26

## 2024-04-28 NOTE — PLAN
[FreeTextEntry1] : INR elevated today, 4.2. Hold coumadin today, tomorrow and day after Schedule preop appt with cardiology for clearance for orthopedic surgery Appt with PCP scheuled for next week with repeat INR Maintain consistent green leafy vegetable intake, avoid major increase or decreases All questions answered

## 2024-04-28 NOTE — HISTORY OF PRESENT ILLNESS
[FreeTextEntry1] : follow up INR [de-identified] : 70 yo F with atrial fibrillation, COPD, pulmonary nodules, HTN, HLD, obesity, prediabetes, presenting for INR follow up. Patient has missed scheduled routine INR checks, has been labile. Admits to increased green leafy vegetables use lately.

## 2024-04-30 ENCOUNTER — RX RENEWAL (OUTPATIENT)
Age: 69
End: 2024-04-30

## 2024-04-30 RX ORDER — VALSARTAN 160 MG/1
160 TABLET, COATED ORAL
Qty: 90 | Refills: 0 | Status: ACTIVE | COMMUNITY
Start: 2023-09-15 | End: 1900-01-01

## 2024-05-02 ENCOUNTER — APPOINTMENT (OUTPATIENT)
Dept: FAMILY MEDICINE | Facility: CLINIC | Age: 69
End: 2024-05-02
Payer: MEDICARE

## 2024-05-02 VITALS
HEART RATE: 61 BPM | WEIGHT: 245 LBS | BODY MASS INDEX: 41.83 KG/M2 | HEIGHT: 64 IN | OXYGEN SATURATION: 95 % | TEMPERATURE: 99.5 F | DIASTOLIC BLOOD PRESSURE: 68 MMHG | SYSTOLIC BLOOD PRESSURE: 118 MMHG

## 2024-05-02 DIAGNOSIS — E78.5 HYPERLIPIDEMIA, UNSPECIFIED: ICD-10-CM

## 2024-05-02 DIAGNOSIS — I10 ESSENTIAL (PRIMARY) HYPERTENSION: ICD-10-CM

## 2024-05-02 DIAGNOSIS — Z01.818 ENCOUNTER FOR OTHER PREPROCEDURAL EXAMINATION: ICD-10-CM

## 2024-05-02 DIAGNOSIS — M19.90 UNSPECIFIED OSTEOARTHRITIS, UNSPECIFIED SITE: ICD-10-CM

## 2024-05-02 DIAGNOSIS — G47.33 OBSTRUCTIVE SLEEP APNEA (ADULT) (PEDIATRIC): ICD-10-CM

## 2024-05-02 DIAGNOSIS — E66.01 MORBID (SEVERE) OBESITY DUE TO EXCESS CALORIES: ICD-10-CM

## 2024-05-02 DIAGNOSIS — M67.911 UNSPECIFIED DISORDER OF SYNOVIUM AND TENDON, RIGHT SHOULDER: ICD-10-CM

## 2024-05-02 DIAGNOSIS — J44.9 CHRONIC OBSTRUCTIVE PULMONARY DISEASE, UNSPECIFIED: ICD-10-CM

## 2024-05-02 DIAGNOSIS — R73.03 PREDIABETES.: ICD-10-CM

## 2024-05-02 DIAGNOSIS — Z79.01 LONG TERM (CURRENT) USE OF ANTICOAGULANTS: ICD-10-CM

## 2024-05-02 DIAGNOSIS — I48.91 UNSPECIFIED ATRIAL FIBRILLATION: ICD-10-CM

## 2024-05-02 LAB — INR PPP: 1.2 RATIO

## 2024-05-02 PROCEDURE — 85610 PROTHROMBIN TIME: CPT | Mod: QW

## 2024-05-02 PROCEDURE — G2211 COMPLEX E/M VISIT ADD ON: CPT

## 2024-05-02 PROCEDURE — 36415 COLL VENOUS BLD VENIPUNCTURE: CPT

## 2024-05-02 PROCEDURE — 99214 OFFICE O/P EST MOD 30 MIN: CPT

## 2024-05-03 ENCOUNTER — APPOINTMENT (OUTPATIENT)
Dept: HEART AND VASCULAR | Facility: CLINIC | Age: 69
End: 2024-05-03
Payer: MEDICARE

## 2024-05-03 ENCOUNTER — RX RENEWAL (OUTPATIENT)
Age: 69
End: 2024-05-03

## 2024-05-03 ENCOUNTER — NON-APPOINTMENT (OUTPATIENT)
Age: 69
End: 2024-05-03

## 2024-05-03 VITALS
OXYGEN SATURATION: 96 % | HEIGHT: 64 IN | SYSTOLIC BLOOD PRESSURE: 132 MMHG | TEMPERATURE: 97.7 F | RESPIRATION RATE: 18 BRPM | WEIGHT: 248.13 LBS | BODY MASS INDEX: 42.36 KG/M2 | DIASTOLIC BLOOD PRESSURE: 84 MMHG | HEART RATE: 70 BPM

## 2024-05-03 PROCEDURE — 93000 ELECTROCARDIOGRAM COMPLETE: CPT

## 2024-05-03 PROCEDURE — 99214 OFFICE O/P EST MOD 30 MIN: CPT

## 2024-05-03 RX ORDER — ALBUTEROL SULFATE 90 UG/1
108 (90 BASE) INHALANT RESPIRATORY (INHALATION)
Qty: 1 | Refills: 2 | Status: ACTIVE | COMMUNITY
Start: 2022-06-27 | End: 1900-01-01

## 2024-05-08 ENCOUNTER — APPOINTMENT (OUTPATIENT)
Dept: CARDIOLOGY | Facility: CLINIC | Age: 69
End: 2024-05-08

## 2024-05-10 DIAGNOSIS — F41.1 GENERALIZED ANXIETY DISORDER: ICD-10-CM

## 2024-05-10 PROBLEM — E66.01 OBESITY, CLASS III, BMI 40-49.9 (MORBID OBESITY): Status: ACTIVE | Noted: 2022-08-12

## 2024-05-10 PROBLEM — R73.03 PREDIABETES: Status: ACTIVE | Noted: 2021-12-03

## 2024-05-10 PROBLEM — I10 ESSENTIAL HYPERTENSION: Status: ACTIVE | Noted: 2021-12-03

## 2024-05-10 PROBLEM — Z01.818 PREOP EXAMINATION: Status: ACTIVE | Noted: 2022-02-16

## 2024-05-10 PROBLEM — I48.91 AFIB: Status: ACTIVE | Noted: 2021-10-06

## 2024-05-10 PROBLEM — M19.90 ARTHRITIS: Status: ACTIVE | Noted: 2022-01-18

## 2024-05-10 PROBLEM — J44.9 COPD (CHRONIC OBSTRUCTIVE PULMONARY DISEASE): Status: ACTIVE | Noted: 2022-01-18

## 2024-05-10 PROBLEM — G47.33 OSA (OBSTRUCTIVE SLEEP APNEA): Status: ACTIVE | Noted: 2023-05-31

## 2024-05-10 PROBLEM — E78.5 DYSLIPIDEMIA: Status: ACTIVE | Noted: 2021-12-03

## 2024-05-10 PROBLEM — Z79.01 LONG TERM (CURRENT) USE OF ANTICOAGULANTS: Status: ACTIVE | Noted: 2021-10-06

## 2024-05-10 PROBLEM — M67.911 DYSFUNCTION OF RIGHT ROTATOR CUFF: Status: ACTIVE | Noted: 2024-05-02

## 2024-05-10 LAB
ALBUMIN SERPL ELPH-MCNC: 4.3 G/DL
ALP BLD-CCNC: 89 U/L
ALT SERPL-CCNC: 20 U/L
ANION GAP SERPL CALC-SCNC: 12 MMOL/L
AST SERPL-CCNC: 17 U/L
BASOPHILS # BLD AUTO: 0.02 K/UL
BASOPHILS NFR BLD AUTO: 0.3 %
BILIRUB SERPL-MCNC: 0.3 MG/DL
BUN SERPL-MCNC: 12 MG/DL
CALCIUM SERPL-MCNC: 8.9 MG/DL
CHLORIDE SERPL-SCNC: 102 MMOL/L
CO2 SERPL-SCNC: 24 MMOL/L
CREAT SERPL-MCNC: 0.64 MG/DL
EGFR: 94 ML/MIN/1.73M2
EOSINOPHIL # BLD AUTO: 0.11 K/UL
EOSINOPHIL NFR BLD AUTO: 1.9 %
GLUCOSE SERPL-MCNC: 105 MG/DL
HCT VFR BLD CALC: 39.9 %
HGB BLD-MCNC: 12.9 G/DL
IMM GRANULOCYTES NFR BLD AUTO: 0.3 %
LYMPHOCYTES # BLD AUTO: 1.64 K/UL
LYMPHOCYTES NFR BLD AUTO: 28.7 %
MAN DIFF?: NORMAL
MCHC RBC-ENTMCNC: 30.6 PG
MCHC RBC-ENTMCNC: 32.3 GM/DL
MCV RBC AUTO: 94.5 FL
MONOCYTES # BLD AUTO: 0.5 K/UL
MONOCYTES NFR BLD AUTO: 8.7 %
NEUTROPHILS # BLD AUTO: 3.43 K/UL
NEUTROPHILS NFR BLD AUTO: 60.1 %
PLATELET # BLD AUTO: 178 K/UL
POTASSIUM SERPL-SCNC: 4.4 MMOL/L
PROT SERPL-MCNC: 6.8 G/DL
RBC # BLD: 4.22 M/UL
RBC # FLD: 13.6 %
SODIUM SERPL-SCNC: 138 MMOL/L
WBC # FLD AUTO: 5.72 K/UL

## 2024-05-10 NOTE — ASSESSMENT
[Patient Optimized for Surgery] : Patient optimized for surgery [No Further Testing Recommended] : no further testing recommended [Modify anticoagulant treatment prior to procedure] : Modify anticoagulant treatment prior to procedure [As per surgery] : as per surgery [FreeTextEntry4] : 70 yo F with atrial fibrillation, HTN, HLD, obesity, COPD, CARMELITA, right rotator cuff pain, presenting for preoperative clearance for surgery with Dr. Oliveros. Reviewed cardiology clearance, obtained, by Dr. Jake Rao. Patient denies any headaches, chest pain, shortness of breath, nausea/vomiting, diarrhea, constipation. Patient to hold warfarin 4-5 days prior to surgery, patient is aware. Patient may proceed with surgery.  [FreeTextEntry5] : stop 4-5 days perioperatively, restart after surgery, follow up with PCP for INR check

## 2024-05-10 NOTE — HISTORY OF PRESENT ILLNESS
[Aortic Stenosis] : no aortic stenosis [Atrial Fibrillation] : atrial fibrillation [Coronary Artery Disease] : no coronary artery disease [Recent Myocardial Infarction] : no recent myocardial infarction [Implantable Device/Pacemaker] : no implantable device/pacemaker [Asthma] : no asthma [COPD] : COPD [Sleep Apnea] : sleep apnea [Smoker] : smoker [No Adverse Anesthesia Reaction] : no adverse anesthesia reaction in self or family member [Chronic Anticoagulation] : chronic anticoagulation [Chronic Kidney Disease] : no chronic kidney disease [Diabetes] : no diabetes [(Patient denies any chest pain, claudication, dyspnea on exertion, orthopnea, palpitations or syncope)] : Patient denies any chest pain, claudication, dyspnea on exertion, orthopnea, palpitations or syncope [Moderate (4-6 METs)] : Moderate (4-6 METs) [Anticoagulants: _____] : Anticoagulants: [unfilled] [FreeTextEntry1] : right rotator cuff repair [FreeTextEntry2] : 05/14/24 [FreeTextEntry3] : Dr. Oliveros [FreeTextEntry4] : 70 yo F with atrial fibrillation, HTN, HLD, obesity, COPD, CARMELITA, right rotator cuff pain, presenting for preoperative clearance for surgery with Dr. Oliveros. INR 1.2 today in office. Reviewed cardiology clearance, obtained, by Dr. Jake Rao. Patient denies any headaches, chest pain, shortness of breath, nausea/vomiting, diarrhea, constipation. Patient to hold warfarin 4-5 days prior to surgery, patient is aware.

## 2024-05-13 ENCOUNTER — APPOINTMENT (OUTPATIENT)
Dept: FAMILY MEDICINE | Facility: CLINIC | Age: 69
End: 2024-05-13

## 2024-05-13 NOTE — DISCUSSION/SUMMARY
[EKG obtained to assist in diagnosis and management of assessed problem(s)] : EKG obtained to assist in diagnosis and management of assessed problem(s) [FreeTextEntry1] : 70 YO F with A fib on coumadin, Hx of Takotsubo with a clean coronaries on , HTN, LDL: 160 a year ago and on crestor 20 and compliant( She states her PCP is checking it), who is here for a pre-op risk stratification for hip surgery. She can not do 5 METS. She has severe COPD and still smokes and does not want to stop. No CP/SOB/palpitations/syncope. EKG: poor R wave progression which is old.    EC/3/2021- sinus bradycardia vpc, T wave inversion v2-v3, avL   Echo: 21- ef 50-55%, septal akinesis, mild MR   Patient has had a recent LHC in the past 3 years and has no symptoms.  There is no sig valvular disease.  No need for any further cardiac testing before the surgery  Intermediate risk for an intermediate risk procedure   A fib: Low CHADS2 vasc score and could stop the coumadin 4-5 days before the surgry and start as soon as possible after

## 2024-05-13 NOTE — HISTORY OF PRESENT ILLNESS
[FreeTextEntry1] : 68 YO F with A fib on coumadin, Hx of Takotsubo with a clean coronaries on , HTN, LDL: 160 a year ago and on crestor 20 and compliant( She states her PCP is checking it), who is here for a pre-op risk stratification for hip surgery. She can not do 5 METS. She has severe COPD and still smokes and does not want to stop. No CP/SOB/palpitations/syncope. EKG: poor R wave progression which is old.    EC/3/2021- sinus bradycardia vpc, T wave inversion v2-v3, avL   Echo: 21- ef 50-55%, septal akinesis, mild MR

## 2024-05-14 ENCOUNTER — TRANSCRIPTION ENCOUNTER (OUTPATIENT)
Age: 69
End: 2024-05-14

## 2024-05-24 RX ORDER — CARVEDILOL 12.5 MG/1
12.5 TABLET, FILM COATED ORAL
Qty: 180 | Refills: 0 | Status: ACTIVE | COMMUNITY
Start: 2022-12-10 | End: 1900-01-01

## 2024-05-28 ENCOUNTER — RX RENEWAL (OUTPATIENT)
Age: 69
End: 2024-05-28

## 2024-05-28 ENCOUNTER — APPOINTMENT (OUTPATIENT)
Dept: FAMILY MEDICINE | Facility: CLINIC | Age: 69
End: 2024-05-28

## 2024-05-28 RX ORDER — ROSUVASTATIN CALCIUM 20 MG/1
20 TABLET, FILM COATED ORAL DAILY
Qty: 90 | Refills: 0 | Status: ACTIVE | COMMUNITY
Start: 1900-01-01 | End: 1900-01-01

## 2024-05-28 NOTE — PROCEDURE: MEDICATION COUNSELING
Bactrim Pregnancy And Lactation Text: This medication is Pregnancy Category D and is known to cause fetal risk.  It is also excreted in breast milk. hearing difficulty

## 2024-06-12 DIAGNOSIS — E55.9 VITAMIN D DEFICIENCY, UNSPECIFIED: ICD-10-CM

## 2024-06-14 ENCOUNTER — APPOINTMENT (OUTPATIENT)
Dept: FAMILY MEDICINE | Facility: CLINIC | Age: 69
End: 2024-06-14

## 2024-06-14 VITALS
TEMPERATURE: 99.5 F | HEART RATE: 78 BPM | DIASTOLIC BLOOD PRESSURE: 60 MMHG | WEIGHT: 246 LBS | HEIGHT: 64 IN | OXYGEN SATURATION: 93 % | BODY MASS INDEX: 42 KG/M2 | SYSTOLIC BLOOD PRESSURE: 116 MMHG

## 2024-06-14 LAB
INR PPP: 2.6 RATIO
POCT-PROTHROMBIN TIME: 30.8 SECS

## 2024-06-14 PROCEDURE — 99214 OFFICE O/P EST MOD 30 MIN: CPT

## 2024-06-14 PROCEDURE — G2211 COMPLEX E/M VISIT ADD ON: CPT

## 2024-06-14 PROCEDURE — 85610 PROTHROMBIN TIME: CPT | Mod: QW

## 2024-06-14 NOTE — HEALTH RISK ASSESSMENT
[Yes] : Yes [Monthly or less (1 pt)] : Monthly or less (1 point) [1 or 2 (0 pts)] : 1 or 2 (0 points) [Never (0 pts)] : Never (0 points) [No falls in past year] : Patient reported no falls in the past year [0] : 2) Feeling down, depressed, or hopeless: Not at all (0) [Current] : Current [[id number]] : 0

## 2024-06-24 ENCOUNTER — NON-APPOINTMENT (OUTPATIENT)
Age: 69
End: 2024-06-24

## 2024-06-25 ENCOUNTER — APPOINTMENT (OUTPATIENT)
Dept: FAMILY MEDICINE | Facility: CLINIC | Age: 69
End: 2024-06-25

## 2024-06-25 RX ORDER — FLUTICASONE PROPIONATE AND SALMETEROL 250; 50 UG/1; UG/1
250-50 POWDER RESPIRATORY (INHALATION)
Qty: 60 | Refills: 3 | Status: ACTIVE | COMMUNITY
Start: 2022-11-18 | End: 1900-01-01

## 2024-06-26 ENCOUNTER — APPOINTMENT (OUTPATIENT)
Dept: THORACIC SURGERY | Facility: CLINIC | Age: 69
End: 2024-06-26
Payer: MEDICARE

## 2024-06-26 VITALS — WEIGHT: 240 LBS | HEIGHT: 64 IN | BODY MASS INDEX: 40.97 KG/M2

## 2024-06-26 DIAGNOSIS — F17.210 NICOTINE DEPENDENCE, CIGARETTES, UNCOMPLICATED: ICD-10-CM

## 2024-06-26 PROCEDURE — G0296 VISIT TO DETERM LDCT ELIG: CPT

## 2024-06-28 DIAGNOSIS — F17.210 NICOTINE DEPENDENCE, CIGARETTES, UNCOMPLICATED: ICD-10-CM

## 2024-07-05 ENCOUNTER — APPOINTMENT (OUTPATIENT)
Dept: PULMONOLOGY | Facility: CLINIC | Age: 69
End: 2024-07-05
Payer: MEDICARE

## 2024-07-05 VITALS
WEIGHT: 246 LBS | HEIGHT: 64 IN | SYSTOLIC BLOOD PRESSURE: 104 MMHG | HEART RATE: 61 BPM | DIASTOLIC BLOOD PRESSURE: 61 MMHG | TEMPERATURE: 98 F | BODY MASS INDEX: 42 KG/M2 | OXYGEN SATURATION: 96 %

## 2024-07-05 DIAGNOSIS — J96.11 CHRONIC RESPIRATORY FAILURE WITH HYPOXIA: ICD-10-CM

## 2024-07-05 DIAGNOSIS — G47.33 OBSTRUCTIVE SLEEP APNEA (ADULT) (PEDIATRIC): ICD-10-CM

## 2024-07-05 PROCEDURE — G2211 COMPLEX E/M VISIT ADD ON: CPT

## 2024-07-05 PROCEDURE — 99214 OFFICE O/P EST MOD 30 MIN: CPT

## 2024-07-05 RX ORDER — BUDESONIDE AND FORMOTEROL FUMARATE DIHYDRATE 160; 4.5 UG/1; UG/1
160-4.5 AEROSOL RESPIRATORY (INHALATION) TWICE DAILY
Qty: 1 | Refills: 3 | Status: ACTIVE | COMMUNITY
Start: 2024-07-05

## 2024-07-08 ENCOUNTER — APPOINTMENT (OUTPATIENT)
Dept: FAMILY MEDICINE | Facility: CLINIC | Age: 69
End: 2024-07-08
Payer: MEDICARE

## 2024-07-08 VITALS
WEIGHT: 241 LBS | SYSTOLIC BLOOD PRESSURE: 130 MMHG | OXYGEN SATURATION: 96 % | DIASTOLIC BLOOD PRESSURE: 60 MMHG | HEIGHT: 64 IN | TEMPERATURE: 99.9 F | BODY MASS INDEX: 41.15 KG/M2 | HEART RATE: 74 BPM

## 2024-07-08 DIAGNOSIS — J44.9 CHRONIC OBSTRUCTIVE PULMONARY DISEASE, UNSPECIFIED: ICD-10-CM

## 2024-07-08 DIAGNOSIS — Z79.01 LONG TERM (CURRENT) USE OF ANTICOAGULANTS: ICD-10-CM

## 2024-07-08 LAB — INR PPP: 1.5 RATIO

## 2024-07-08 PROCEDURE — G2211 COMPLEX E/M VISIT ADD ON: CPT | Mod: NC

## 2024-07-08 PROCEDURE — 85610 PROTHROMBIN TIME: CPT | Mod: QW

## 2024-07-08 PROCEDURE — 99495 TRANSJ CARE MGMT MOD F2F 14D: CPT

## 2024-07-08 RX ORDER — ASPIRIN ENTERIC COATED TABLETS 81 MG 81 MG/1
81 TABLET, DELAYED RELEASE ORAL
Refills: 0 | Status: ACTIVE | COMMUNITY

## 2024-07-17 ENCOUNTER — RESULT REVIEW (OUTPATIENT)
Age: 69
End: 2024-07-17

## 2024-07-19 ENCOUNTER — APPOINTMENT (OUTPATIENT)
Dept: ENDOCRINOLOGY | Facility: CLINIC | Age: 69
End: 2024-07-19

## 2024-07-19 DIAGNOSIS — Z98.890 OTHER SPECIFIED POSTPROCEDURAL STATES: ICD-10-CM

## 2024-07-19 DIAGNOSIS — R73.03 PREDIABETES.: ICD-10-CM

## 2024-07-19 DIAGNOSIS — E55.9 VITAMIN D DEFICIENCY, UNSPECIFIED: ICD-10-CM

## 2024-07-19 DIAGNOSIS — E04.2 NONTOXIC MULTINODULAR GOITER: ICD-10-CM

## 2024-07-19 DIAGNOSIS — Z90.89 OTHER SPECIFIED POSTPROCEDURAL STATES: ICD-10-CM

## 2024-07-19 DIAGNOSIS — K76.0 FATTY (CHANGE OF) LIVER, NOT ELSEWHERE CLASSIFIED: ICD-10-CM

## 2024-07-19 DIAGNOSIS — E78.5 HYPERLIPIDEMIA, UNSPECIFIED: ICD-10-CM

## 2024-07-19 PROCEDURE — 99215 OFFICE O/P EST HI 40 MIN: CPT

## 2024-07-19 PROCEDURE — G2211 COMPLEX E/M VISIT ADD ON: CPT

## 2024-07-22 ENCOUNTER — RX RENEWAL (OUTPATIENT)
Age: 69
End: 2024-07-22

## 2024-07-23 ENCOUNTER — RESULT REVIEW (OUTPATIENT)
Age: 69
End: 2024-07-23

## 2024-07-24 ENCOUNTER — RX RENEWAL (OUTPATIENT)
Age: 69
End: 2024-07-24

## 2024-07-26 ENCOUNTER — RX RENEWAL (OUTPATIENT)
Age: 69
End: 2024-07-26

## 2024-07-30 ENCOUNTER — RX RENEWAL (OUTPATIENT)
Age: 69
End: 2024-07-30

## 2024-08-02 ENCOUNTER — APPOINTMENT (OUTPATIENT)
Dept: FAMILY MEDICINE | Facility: CLINIC | Age: 69
End: 2024-08-02

## 2024-08-15 ENCOUNTER — APPOINTMENT (OUTPATIENT)
Dept: FAMILY MEDICINE | Facility: CLINIC | Age: 69
End: 2024-08-15

## 2024-08-15 VITALS
WEIGHT: 241 LBS | HEIGHT: 64 IN | BODY MASS INDEX: 41.15 KG/M2 | SYSTOLIC BLOOD PRESSURE: 118 MMHG | DIASTOLIC BLOOD PRESSURE: 70 MMHG

## 2024-08-15 LAB — INR PPP: 2.9 RATIO

## 2024-08-15 PROCEDURE — G0439: CPT

## 2024-08-15 PROCEDURE — 85610 PROTHROMBIN TIME: CPT | Mod: QW

## 2024-08-15 RX ORDER — ROSUVASTATIN CALCIUM 40 MG/1
40 TABLET, FILM COATED ORAL
Refills: 0 | Status: ACTIVE | COMMUNITY

## 2024-08-15 RX ORDER — CARVEDILOL 12.5 MG/1
12.5 TABLET, FILM COATED ORAL DAILY
Refills: 0 | Status: ACTIVE | COMMUNITY

## 2024-08-15 RX ORDER — FUROSEMIDE 20 MG/1
20 TABLET ORAL
Refills: 0 | Status: ACTIVE | COMMUNITY

## 2024-08-19 ENCOUNTER — RX RENEWAL (OUTPATIENT)
Age: 69
End: 2024-08-19

## 2024-08-27 PROBLEM — I50.9 CHF (CONGESTIVE HEART FAILURE): Status: ACTIVE | Noted: 2024-08-27

## 2024-09-05 ENCOUNTER — APPOINTMENT (OUTPATIENT)
Dept: OBGYN | Facility: CLINIC | Age: 69
End: 2024-09-05

## 2024-09-18 ENCOUNTER — APPOINTMENT (OUTPATIENT)
Dept: INTERNAL MEDICINE | Facility: CLINIC | Age: 69
End: 2024-09-18

## 2024-09-19 ENCOUNTER — RX RENEWAL (OUTPATIENT)
Age: 69
End: 2024-09-19

## 2024-09-20 ENCOUNTER — RX RENEWAL (OUTPATIENT)
Age: 69
End: 2024-09-20

## 2024-09-24 ENCOUNTER — RX RENEWAL (OUTPATIENT)
Age: 69
End: 2024-09-24

## 2024-10-08 ENCOUNTER — RX RENEWAL (OUTPATIENT)
Age: 69
End: 2024-10-08

## 2024-10-15 ENCOUNTER — RX RENEWAL (OUTPATIENT)
Age: 69
End: 2024-10-15

## 2024-10-22 ENCOUNTER — APPOINTMENT (OUTPATIENT)
Dept: FAMILY MEDICINE | Facility: CLINIC | Age: 69
End: 2024-10-22
Payer: MEDICARE

## 2024-10-22 VITALS
TEMPERATURE: 100 F | DIASTOLIC BLOOD PRESSURE: 70 MMHG | HEIGHT: 64 IN | WEIGHT: 248 LBS | SYSTOLIC BLOOD PRESSURE: 126 MMHG | OXYGEN SATURATION: 94 % | HEART RATE: 76 BPM | BODY MASS INDEX: 42.34 KG/M2

## 2024-10-22 DIAGNOSIS — J44.9 CHRONIC OBSTRUCTIVE PULMONARY DISEASE, UNSPECIFIED: ICD-10-CM

## 2024-10-22 DIAGNOSIS — E66.01 MORBID (SEVERE) OBESITY DUE TO EXCESS CALORIES: ICD-10-CM

## 2024-10-22 DIAGNOSIS — R73.03 PREDIABETES.: ICD-10-CM

## 2024-10-22 DIAGNOSIS — I48.91 UNSPECIFIED ATRIAL FIBRILLATION: ICD-10-CM

## 2024-10-22 LAB — INR PPP: 1.6 RATIO

## 2024-10-22 PROCEDURE — 85610 PROTHROMBIN TIME: CPT | Mod: QW

## 2024-10-22 PROCEDURE — 99214 OFFICE O/P EST MOD 30 MIN: CPT

## 2024-10-22 PROCEDURE — G2211 COMPLEX E/M VISIT ADD ON: CPT

## 2024-10-22 RX ORDER — TIRZEPATIDE 2.5 MG/.5ML
2.5 INJECTION, SOLUTION SUBCUTANEOUS
Qty: 1 | Refills: 1 | Status: ACTIVE | COMMUNITY
Start: 2024-10-22 | End: 1900-01-01

## 2024-10-22 RX ORDER — WARFARIN 1 MG/1
1 TABLET ORAL
Qty: 12 | Refills: 2 | Status: ACTIVE | COMMUNITY
Start: 2024-10-22 | End: 1900-01-01

## 2024-11-11 ENCOUNTER — RX RENEWAL (OUTPATIENT)
Age: 69
End: 2024-11-11

## 2024-11-12 ENCOUNTER — APPOINTMENT (OUTPATIENT)
Dept: FAMILY MEDICINE | Facility: CLINIC | Age: 69
End: 2024-11-12
Payer: MEDICARE

## 2024-11-12 VITALS
SYSTOLIC BLOOD PRESSURE: 120 MMHG | OXYGEN SATURATION: 94 % | BODY MASS INDEX: 42.34 KG/M2 | WEIGHT: 248 LBS | HEART RATE: 76 BPM | HEIGHT: 64 IN | DIASTOLIC BLOOD PRESSURE: 70 MMHG

## 2024-11-12 DIAGNOSIS — I48.91 UNSPECIFIED ATRIAL FIBRILLATION: ICD-10-CM

## 2024-11-12 DIAGNOSIS — Z79.01 LONG TERM (CURRENT) USE OF ANTICOAGULANTS: ICD-10-CM

## 2024-11-12 PROCEDURE — 99214 OFFICE O/P EST MOD 30 MIN: CPT

## 2024-11-12 PROCEDURE — 85610 PROTHROMBIN TIME: CPT | Mod: QW

## 2024-11-12 PROCEDURE — G2211 COMPLEX E/M VISIT ADD ON: CPT

## 2024-11-12 RX ORDER — TIRZEPATIDE 2.5 MG/.5ML
2.5 INJECTION, SOLUTION SUBCUTANEOUS
Qty: 1 | Refills: 1 | Status: ACTIVE | COMMUNITY
Start: 2024-11-12 | End: 1900-01-01

## 2024-11-14 LAB — INR PPP: 3.8 RATIO

## 2024-11-15 ENCOUNTER — RESULT REVIEW (OUTPATIENT)
Age: 69
End: 2024-11-15

## 2024-12-03 ENCOUNTER — APPOINTMENT (OUTPATIENT)
Dept: FAMILY MEDICINE | Facility: CLINIC | Age: 69
End: 2024-12-03

## 2024-12-12 ENCOUNTER — NON-APPOINTMENT (OUTPATIENT)
Age: 69
End: 2024-12-12

## 2024-12-12 DIAGNOSIS — K63.5 POLYP OF COLON: ICD-10-CM

## 2024-12-12 DIAGNOSIS — Z01.818 ENCOUNTER FOR OTHER PREPROCEDURAL EXAMINATION: ICD-10-CM

## 2024-12-12 DIAGNOSIS — K64.8 OTHER HEMORRHOIDS: ICD-10-CM

## 2024-12-12 DIAGNOSIS — R31.9 HEMATURIA, UNSPECIFIED: ICD-10-CM

## 2024-12-12 DIAGNOSIS — K62.5 HEMORRHAGE OF ANUS AND RECTUM: ICD-10-CM

## 2024-12-12 DIAGNOSIS — R92.30 DENSE BREASTS, UNSPECIFIED: ICD-10-CM

## 2024-12-12 DIAGNOSIS — R91.1 SOLITARY PULMONARY NODULE: ICD-10-CM

## 2024-12-12 DIAGNOSIS — K76.0 FATTY (CHANGE OF) LIVER, NOT ELSEWHERE CLASSIFIED: ICD-10-CM

## 2024-12-12 DIAGNOSIS — Z92.89 PERSONAL HISTORY OF OTHER MEDICAL TREATMENT: ICD-10-CM

## 2024-12-12 DIAGNOSIS — J44.9 CHRONIC OBSTRUCTIVE PULMONARY DISEASE, UNSPECIFIED: ICD-10-CM

## 2024-12-12 DIAGNOSIS — M67.911 UNSPECIFIED DISORDER OF SYNOVIUM AND TENDON, RIGHT SHOULDER: ICD-10-CM

## 2024-12-13 ENCOUNTER — RX RENEWAL (OUTPATIENT)
Age: 69
End: 2024-12-13

## 2024-12-13 ENCOUNTER — NON-APPOINTMENT (OUTPATIENT)
Age: 69
End: 2024-12-13

## 2024-12-13 ENCOUNTER — APPOINTMENT (OUTPATIENT)
Dept: CARDIOLOGY | Facility: CLINIC | Age: 69
End: 2024-12-13
Payer: MEDICARE

## 2024-12-13 VITALS
BODY MASS INDEX: 42 KG/M2 | HEIGHT: 64 IN | DIASTOLIC BLOOD PRESSURE: 90 MMHG | WEIGHT: 246 LBS | SYSTOLIC BLOOD PRESSURE: 146 MMHG

## 2024-12-13 DIAGNOSIS — Z78.9 OTHER SPECIFIED HEALTH STATUS: ICD-10-CM

## 2024-12-13 DIAGNOSIS — E66.01 MORBID (SEVERE) OBESITY DUE TO EXCESS CALORIES: ICD-10-CM

## 2024-12-13 DIAGNOSIS — G47.33 OBSTRUCTIVE SLEEP APNEA (ADULT) (PEDIATRIC): ICD-10-CM

## 2024-12-13 DIAGNOSIS — M19.90 UNSPECIFIED OSTEOARTHRITIS, UNSPECIFIED SITE: ICD-10-CM

## 2024-12-13 DIAGNOSIS — R79.89 OTHER SPECIFIED ABNORMAL FINDINGS OF BLOOD CHEMISTRY: ICD-10-CM

## 2024-12-13 DIAGNOSIS — I48.91 UNSPECIFIED ATRIAL FIBRILLATION: ICD-10-CM

## 2024-12-13 DIAGNOSIS — Z98.890 OTHER SPECIFIED POSTPROCEDURAL STATES: ICD-10-CM

## 2024-12-13 DIAGNOSIS — R68.89 OTHER GENERAL SYMPTOMS AND SIGNS: ICD-10-CM

## 2024-12-13 DIAGNOSIS — I47.19 OTHER SUPRAVENTRICULAR TACHYCARDIA: ICD-10-CM

## 2024-12-13 DIAGNOSIS — E78.5 HYPERLIPIDEMIA, UNSPECIFIED: ICD-10-CM

## 2024-12-13 DIAGNOSIS — Z86.79 PERSONAL HISTORY OF OTHER DISEASES OF THE CIRCULATORY SYSTEM: ICD-10-CM

## 2024-12-13 DIAGNOSIS — I10 ESSENTIAL (PRIMARY) HYPERTENSION: ICD-10-CM

## 2024-12-13 DIAGNOSIS — R73.03 PREDIABETES.: ICD-10-CM

## 2024-12-13 DIAGNOSIS — Z79.01 LONG TERM (CURRENT) USE OF ANTICOAGULANTS: ICD-10-CM

## 2024-12-13 DIAGNOSIS — J96.11 CHRONIC RESPIRATORY FAILURE WITH HYPOXIA: ICD-10-CM

## 2024-12-13 DIAGNOSIS — I50.9 HEART FAILURE, UNSPECIFIED: ICD-10-CM

## 2024-12-13 DIAGNOSIS — I25.2 OLD MYOCARDIAL INFARCTION: ICD-10-CM

## 2024-12-13 PROCEDURE — G0008: CPT

## 2024-12-13 PROCEDURE — 93000 ELECTROCARDIOGRAM COMPLETE: CPT

## 2024-12-13 PROCEDURE — 90662 IIV NO PRSV INCREASED AG IM: CPT

## 2024-12-13 PROCEDURE — 99215 OFFICE O/P EST HI 40 MIN: CPT

## 2024-12-13 RX ORDER — VENLAFAXINE HYDROCHLORIDE 75 MG/1
75 CAPSULE, EXTENDED RELEASE ORAL DAILY
Qty: 90 | Refills: 2 | Status: DISCONTINUED | COMMUNITY
Start: 2024-12-09 | End: 2024-12-13

## 2024-12-17 DIAGNOSIS — Z23 ENCOUNTER FOR IMMUNIZATION: ICD-10-CM

## 2024-12-19 ENCOUNTER — RX RENEWAL (OUTPATIENT)
Age: 69
End: 2024-12-19

## 2025-01-08 ENCOUNTER — APPOINTMENT (OUTPATIENT)
Dept: ORTHOPEDIC SURGERY | Facility: CLINIC | Age: 70
End: 2025-01-08

## 2025-01-14 ENCOUNTER — APPOINTMENT (OUTPATIENT)
Dept: ORTHOPEDIC SURGERY | Facility: CLINIC | Age: 70
End: 2025-01-14
Payer: MEDICARE

## 2025-01-14 VITALS
HEIGHT: 64 IN | DIASTOLIC BLOOD PRESSURE: 81 MMHG | BODY MASS INDEX: 42 KG/M2 | SYSTOLIC BLOOD PRESSURE: 132 MMHG | WEIGHT: 246 LBS | OXYGEN SATURATION: 91 % | HEART RATE: 72 BPM

## 2025-01-14 DIAGNOSIS — M47.816 SPONDYLOSIS W/OUT MYELOPATHY OR RADICULOPATHY, LUMBAR REGION: ICD-10-CM

## 2025-01-14 DIAGNOSIS — M43.16: ICD-10-CM

## 2025-01-14 DIAGNOSIS — M48.062 SPINAL STENOSIS, LUMBAR REGION WITH NEUROGENIC CLAUDICATION: ICD-10-CM

## 2025-01-14 DIAGNOSIS — M51.369: ICD-10-CM

## 2025-01-14 PROCEDURE — 72170 X-RAY EXAM OF PELVIS: CPT

## 2025-01-14 PROCEDURE — 99204 OFFICE O/P NEW MOD 45 MIN: CPT

## 2025-01-14 PROCEDURE — 72110 X-RAY EXAM L-2 SPINE 4/>VWS: CPT

## 2025-01-14 RX ORDER — TRAMADOL HYDROCHLORIDE 50 MG/1
50 TABLET, COATED ORAL
Qty: 21 | Refills: 0 | Status: ACTIVE | COMMUNITY
Start: 2025-01-14 | End: 1900-01-01

## 2025-01-17 NOTE — PROCEDURE: MEDICATION COUNSELING
Left very detailed voicemail for patient relaying recommendations from Dr. Hernandez for patient to rest, ice, compress, and elevate her right leg to see if the hematoma and swelling will start to resolve on it's own. Dr. Hernandez would prefer to see patient in the office prior to ordering MRI. Offered patient a sooner appointment at [facility] on 1/22. Otherwise Dr. Hernandez's next available at [place] is 1/31. Call back number provided for patient to schedule an appointment.    Fluconazole Counseling:  Patient counseled regarding adverse effects of fluconazole including but not limited to headache, diarrhea, nausea, upset stomach, liver function test abnormalities, taste disturbance, and stomach pain.  There is a rare possibility of liver failure that can occur when taking fluconazole.  The patient understands that monitoring of LFTs and kidney function test may be required, especially at baseline. The patient verbalized understanding of the proper use and possible adverse effects of fluconazole.  All of the patient's questions and concerns were addressed.

## 2025-01-27 ENCOUNTER — APPOINTMENT (OUTPATIENT)
Dept: ENDOCRINOLOGY | Facility: CLINIC | Age: 70
End: 2025-01-27
Payer: MEDICARE

## 2025-01-27 DIAGNOSIS — Z86.39 PERSONAL HISTORY OF OTHER ENDOCRINE, NUTRITIONAL AND METABOLIC DISEASE: ICD-10-CM

## 2025-01-27 DIAGNOSIS — E04.2 NONTOXIC MULTINODULAR GOITER: ICD-10-CM

## 2025-01-27 DIAGNOSIS — K76.0 FATTY (CHANGE OF) LIVER, NOT ELSEWHERE CLASSIFIED: ICD-10-CM

## 2025-01-27 DIAGNOSIS — R73.03 PREDIABETES.: ICD-10-CM

## 2025-01-27 DIAGNOSIS — E55.9 VITAMIN D DEFICIENCY, UNSPECIFIED: ICD-10-CM

## 2025-01-27 DIAGNOSIS — E78.5 HYPERLIPIDEMIA, UNSPECIFIED: ICD-10-CM

## 2025-01-27 DIAGNOSIS — E66.01 MORBID (SEVERE) OBESITY DUE TO EXCESS CALORIES: ICD-10-CM

## 2025-01-27 PROCEDURE — G2211 COMPLEX E/M VISIT ADD ON: CPT | Mod: 2W

## 2025-01-27 PROCEDURE — 99215 OFFICE O/P EST HI 40 MIN: CPT | Mod: 2W

## 2025-01-31 ENCOUNTER — APPOINTMENT (OUTPATIENT)
Dept: FAMILY MEDICINE | Facility: CLINIC | Age: 70
End: 2025-01-31

## 2025-02-03 ENCOUNTER — RX RENEWAL (OUTPATIENT)
Age: 70
End: 2025-02-03

## 2025-02-18 ENCOUNTER — APPOINTMENT (OUTPATIENT)
Dept: FAMILY MEDICINE | Facility: CLINIC | Age: 70
End: 2025-02-18

## 2025-02-18 ENCOUNTER — RESULT REVIEW (OUTPATIENT)
Age: 70
End: 2025-02-18

## 2025-02-18 VITALS
DIASTOLIC BLOOD PRESSURE: 80 MMHG | HEIGHT: 64 IN | SYSTOLIC BLOOD PRESSURE: 110 MMHG | WEIGHT: 246 LBS | BODY MASS INDEX: 42 KG/M2

## 2025-02-18 DIAGNOSIS — Z79.01 LONG TERM (CURRENT) USE OF ANTICOAGULANTS: ICD-10-CM

## 2025-02-18 DIAGNOSIS — I48.91 UNSPECIFIED ATRIAL FIBRILLATION: ICD-10-CM

## 2025-02-18 LAB — INR PPP: 2 RATIO

## 2025-02-18 PROCEDURE — 99214 OFFICE O/P EST MOD 30 MIN: CPT

## 2025-02-18 PROCEDURE — 85610 PROTHROMBIN TIME: CPT | Mod: QW

## 2025-02-18 PROCEDURE — G2211 COMPLEX E/M VISIT ADD ON: CPT

## 2025-03-03 ENCOUNTER — RX RENEWAL (OUTPATIENT)
Age: 70
End: 2025-03-03

## 2025-03-05 ENCOUNTER — NON-APPOINTMENT (OUTPATIENT)
Age: 70
End: 2025-03-05

## 2025-03-05 DIAGNOSIS — M47.816 SPONDYLOSIS W/OUT MYELOPATHY OR RADICULOPATHY, LUMBAR REGION: ICD-10-CM

## 2025-03-05 DIAGNOSIS — M48.062 SPINAL STENOSIS, LUMBAR REGION WITH NEUROGENIC CLAUDICATION: ICD-10-CM

## 2025-03-05 DIAGNOSIS — E55.9 VITAMIN D DEFICIENCY, UNSPECIFIED: ICD-10-CM

## 2025-03-05 DIAGNOSIS — E78.5 HYPERLIPIDEMIA, UNSPECIFIED: ICD-10-CM

## 2025-03-05 DIAGNOSIS — M51.369: ICD-10-CM

## 2025-03-05 DIAGNOSIS — M43.16: ICD-10-CM

## 2025-03-11 ENCOUNTER — APPOINTMENT (OUTPATIENT)
Dept: FAMILY MEDICINE | Facility: CLINIC | Age: 70
End: 2025-03-11
Payer: MEDICARE

## 2025-03-11 VITALS — HEART RATE: 63 BPM | SYSTOLIC BLOOD PRESSURE: 120 MMHG | OXYGEN SATURATION: 95 % | DIASTOLIC BLOOD PRESSURE: 80 MMHG

## 2025-03-11 DIAGNOSIS — I48.91 UNSPECIFIED ATRIAL FIBRILLATION: ICD-10-CM

## 2025-03-11 DIAGNOSIS — Z79.01 LONG TERM (CURRENT) USE OF ANTICOAGULANTS: ICD-10-CM

## 2025-03-11 LAB
INR PPP: 2.6 RATIO
POCT-PROTHROMBIN TIME: 33 SECS

## 2025-03-11 PROCEDURE — 85610 PROTHROMBIN TIME: CPT | Mod: QW

## 2025-03-11 PROCEDURE — 99214 OFFICE O/P EST MOD 30 MIN: CPT

## 2025-03-11 PROCEDURE — G2211 COMPLEX E/M VISIT ADD ON: CPT

## 2025-03-19 ENCOUNTER — APPOINTMENT (OUTPATIENT)
Dept: ORTHOPEDIC SURGERY | Facility: CLINIC | Age: 70
End: 2025-03-19

## 2025-04-03 ENCOUNTER — APPOINTMENT (OUTPATIENT)
Dept: CARDIOLOGY | Facility: CLINIC | Age: 70
End: 2025-04-03

## 2025-04-10 ENCOUNTER — APPOINTMENT (OUTPATIENT)
Dept: ORTHOPEDIC SURGERY | Facility: CLINIC | Age: 70
End: 2025-04-10

## 2025-04-23 ENCOUNTER — APPOINTMENT (OUTPATIENT)
Dept: PULMONOLOGY | Facility: CLINIC | Age: 70
End: 2025-04-23
Payer: MEDICARE

## 2025-04-23 VITALS
DIASTOLIC BLOOD PRESSURE: 80 MMHG | HEIGHT: 64 IN | HEART RATE: 60 BPM | OXYGEN SATURATION: 93 % | SYSTOLIC BLOOD PRESSURE: 122 MMHG | WEIGHT: 247 LBS | BODY MASS INDEX: 42.17 KG/M2

## 2025-04-23 DIAGNOSIS — G47.33 OBSTRUCTIVE SLEEP APNEA (ADULT) (PEDIATRIC): ICD-10-CM

## 2025-04-23 DIAGNOSIS — J44.9 CHRONIC OBSTRUCTIVE PULMONARY DISEASE, UNSPECIFIED: ICD-10-CM

## 2025-04-23 PROCEDURE — G2211 COMPLEX E/M VISIT ADD ON: CPT

## 2025-04-23 PROCEDURE — 99204 OFFICE O/P NEW MOD 45 MIN: CPT

## 2025-04-25 ENCOUNTER — APPOINTMENT (OUTPATIENT)
Dept: FAMILY MEDICINE | Facility: CLINIC | Age: 70
End: 2025-04-25

## 2025-05-06 ENCOUNTER — APPOINTMENT (OUTPATIENT)
Dept: FAMILY MEDICINE | Facility: CLINIC | Age: 70
End: 2025-05-06
Payer: MEDICARE

## 2025-05-06 VITALS
HEART RATE: 92 BPM | DIASTOLIC BLOOD PRESSURE: 70 MMHG | WEIGHT: 242 LBS | SYSTOLIC BLOOD PRESSURE: 130 MMHG | BODY MASS INDEX: 41.32 KG/M2 | TEMPERATURE: 97.2 F | OXYGEN SATURATION: 94 % | HEIGHT: 64 IN

## 2025-05-06 DIAGNOSIS — Z79.01 LONG TERM (CURRENT) USE OF ANTICOAGULANTS: ICD-10-CM

## 2025-05-06 DIAGNOSIS — F32.A DEPRESSION, UNSPECIFIED: ICD-10-CM

## 2025-05-06 DIAGNOSIS — I48.91 UNSPECIFIED ATRIAL FIBRILLATION: ICD-10-CM

## 2025-05-06 LAB — INR PPP: 2.1 RATIO

## 2025-05-06 PROCEDURE — 85610 PROTHROMBIN TIME: CPT | Mod: QW

## 2025-05-06 PROCEDURE — G2211 COMPLEX E/M VISIT ADD ON: CPT

## 2025-05-06 PROCEDURE — 99214 OFFICE O/P EST MOD 30 MIN: CPT

## 2025-05-08 ENCOUNTER — RX RENEWAL (OUTPATIENT)
Age: 70
End: 2025-05-08

## 2025-05-22 ENCOUNTER — APPOINTMENT (OUTPATIENT)
Dept: ORTHOPEDIC SURGERY | Facility: CLINIC | Age: 70
End: 2025-05-22
Payer: MEDICARE

## 2025-05-22 VITALS
HEART RATE: 88 BPM | RESPIRATION RATE: 16 BRPM | SYSTOLIC BLOOD PRESSURE: 128 MMHG | HEIGHT: 64 IN | DIASTOLIC BLOOD PRESSURE: 66 MMHG | TEMPERATURE: 96.3 F | OXYGEN SATURATION: 99 % | BODY MASS INDEX: 41.32 KG/M2 | WEIGHT: 242 LBS

## 2025-05-22 DIAGNOSIS — M51.369: ICD-10-CM

## 2025-05-22 DIAGNOSIS — M43.16: ICD-10-CM

## 2025-05-22 DIAGNOSIS — M47.816 SPONDYLOSIS W/OUT MYELOPATHY OR RADICULOPATHY, LUMBAR REGION: ICD-10-CM

## 2025-05-22 PROCEDURE — 99214 OFFICE O/P EST MOD 30 MIN: CPT

## 2025-05-27 ENCOUNTER — RX RENEWAL (OUTPATIENT)
Age: 70
End: 2025-05-27

## 2025-05-28 ENCOUNTER — APPOINTMENT (OUTPATIENT)
Dept: PAIN MANAGEMENT | Facility: CLINIC | Age: 70
End: 2025-05-28
Payer: MEDICARE

## 2025-05-28 VITALS
DIASTOLIC BLOOD PRESSURE: 75 MMHG | WEIGHT: 242 LBS | HEIGHT: 64 IN | BODY MASS INDEX: 41.32 KG/M2 | SYSTOLIC BLOOD PRESSURE: 135 MMHG

## 2025-05-28 DIAGNOSIS — G89.4 CHRONIC PAIN SYNDROME: ICD-10-CM

## 2025-05-28 DIAGNOSIS — M79.18 MYALGIA, OTHER SITE: ICD-10-CM

## 2025-05-28 DIAGNOSIS — M47.817 SPONDYLOSIS W/OUT MYELOPATHY OR RADICULOPATHY, LUMBOSACRAL REGION: ICD-10-CM

## 2025-05-28 DIAGNOSIS — M48.062 SPINAL STENOSIS, LUMBAR REGION WITH NEUROGENIC CLAUDICATION: ICD-10-CM

## 2025-05-28 DIAGNOSIS — M79.2 NEURALGIA AND NEURITIS, UNSPECIFIED: ICD-10-CM

## 2025-05-28 PROCEDURE — 99204 OFFICE O/P NEW MOD 45 MIN: CPT

## 2025-06-09 ENCOUNTER — RX RENEWAL (OUTPATIENT)
Age: 70
End: 2025-06-09

## 2025-06-11 ENCOUNTER — APPOINTMENT (OUTPATIENT)
Dept: PAIN MANAGEMENT | Facility: CLINIC | Age: 70
End: 2025-06-11
Payer: MEDICARE

## 2025-06-11 VITALS
DIASTOLIC BLOOD PRESSURE: 89 MMHG | OXYGEN SATURATION: 94 % | RESPIRATION RATE: 15 BRPM | SYSTOLIC BLOOD PRESSURE: 151 MMHG | HEART RATE: 62 BPM

## 2025-06-11 PROCEDURE — 64494 INJ PARAVERT F JNT L/S 2 LEV: CPT | Mod: 50

## 2025-06-11 PROCEDURE — 64493 INJ PARAVERT F JNT L/S 1 LEV: CPT | Mod: 50

## 2025-06-11 RX ADMIN — BUPIVACAINE HYDROCHLORIDE 0 %: 5 INJECTION, SOLUTION EPIDURAL; INTRACAUDAL; PERINEURAL at 00:00

## 2025-06-11 RX ADMIN — Medication %: at 00:00

## 2025-06-11 RX ADMIN — TRIAMCINOLONE ACETONIDE 0 MG/ML: 40 INJECTION, SUSPENSION INTRA-ARTICULAR; INTRAMUSCULAR at 00:00

## 2025-06-12 ENCOUNTER — APPOINTMENT (OUTPATIENT)
Dept: PAIN MANAGEMENT | Facility: CLINIC | Age: 70
End: 2025-06-12
Payer: MEDICARE

## 2025-06-12 PROCEDURE — 99214 OFFICE O/P EST MOD 30 MIN: CPT | Mod: 2W

## 2025-06-12 PROCEDURE — G2211 COMPLEX E/M VISIT ADD ON: CPT | Mod: 2W

## 2025-06-12 RX ORDER — GABAPENTIN 300 MG/1
300 CAPSULE ORAL
Qty: 90 | Refills: 1 | Status: ACTIVE | COMMUNITY
Start: 2025-06-12 | End: 1900-01-01

## 2025-07-03 ENCOUNTER — APPOINTMENT (OUTPATIENT)
Dept: ENDOCRINOLOGY | Facility: CLINIC | Age: 70
End: 2025-07-03

## 2025-07-07 ENCOUNTER — NON-APPOINTMENT (OUTPATIENT)
Age: 70
End: 2025-07-07

## 2025-07-08 ENCOUNTER — RESULT REVIEW (OUTPATIENT)
Age: 70
End: 2025-07-08

## 2025-07-08 ENCOUNTER — APPOINTMENT (OUTPATIENT)
Dept: PAIN MANAGEMENT | Facility: CLINIC | Age: 70
End: 2025-07-08
Payer: MEDICARE

## 2025-07-08 VITALS
HEART RATE: 63 BPM | SYSTOLIC BLOOD PRESSURE: 176 MMHG | DIASTOLIC BLOOD PRESSURE: 76 MMHG | OXYGEN SATURATION: 92 % | RESPIRATION RATE: 15 BRPM

## 2025-07-08 PROCEDURE — 62323 NJX INTERLAMINAR LMBR/SAC: CPT

## 2025-07-08 RX ADMIN — Medication %: at 00:00

## 2025-07-08 RX ADMIN — TRIAMCINOLONE ACETONIDE 0 MG/ML: 40 INJECTION, SUSPENSION INTRA-ARTICULAR; INTRAMUSCULAR at 00:00

## 2025-07-08 RX ADMIN — IOHEXOL 0 MG/ML: 180 INJECTION INTRAVENOUS at 00:00

## 2025-07-17 ENCOUNTER — APPOINTMENT (OUTPATIENT)
Dept: PULMONOLOGY | Facility: CLINIC | Age: 70
End: 2025-07-17
Payer: MEDICARE

## 2025-07-17 VITALS
DIASTOLIC BLOOD PRESSURE: 76 MMHG | BODY MASS INDEX: 40.97 KG/M2 | OXYGEN SATURATION: 96 % | SYSTOLIC BLOOD PRESSURE: 130 MMHG | WEIGHT: 240 LBS | HEART RATE: 56 BPM | HEIGHT: 64 IN

## 2025-07-17 PROBLEM — G47.34 NOCTURNAL HYPOXEMIA: Status: ACTIVE | Noted: 2025-07-17

## 2025-07-17 PROCEDURE — 99213 OFFICE O/P EST LOW 20 MIN: CPT

## 2025-07-17 PROCEDURE — G2211 COMPLEX E/M VISIT ADD ON: CPT

## 2025-07-23 ENCOUNTER — APPOINTMENT (OUTPATIENT)
Dept: PAIN MANAGEMENT | Facility: CLINIC | Age: 70
End: 2025-07-23
Payer: MEDICARE

## 2025-07-23 DIAGNOSIS — M79.2 NEURALGIA AND NEURITIS, UNSPECIFIED: ICD-10-CM

## 2025-07-23 DIAGNOSIS — M48.062 SPINAL STENOSIS, LUMBAR REGION WITH NEUROGENIC CLAUDICATION: ICD-10-CM

## 2025-07-23 DIAGNOSIS — M79.18 MYALGIA, OTHER SITE: ICD-10-CM

## 2025-07-23 DIAGNOSIS — G89.4 CHRONIC PAIN SYNDROME: ICD-10-CM

## 2025-07-23 DIAGNOSIS — M47.817 SPONDYLOSIS W/OUT MYELOPATHY OR RADICULOPATHY, LUMBOSACRAL REGION: ICD-10-CM

## 2025-07-23 PROCEDURE — G2211 COMPLEX E/M VISIT ADD ON: CPT | Mod: 2W

## 2025-07-23 PROCEDURE — 99214 OFFICE O/P EST MOD 30 MIN: CPT | Mod: 2W

## 2025-07-31 RX ORDER — TRAMADOL HYDROCHLORIDE 50 MG/1
50 TABLET, COATED ORAL
Qty: 10 | Refills: 0 | Status: ACTIVE | COMMUNITY
Start: 2025-07-31 | End: 1900-01-01

## 2025-08-06 ENCOUNTER — APPOINTMENT (OUTPATIENT)
Dept: ORTHOPEDIC SURGERY | Facility: CLINIC | Age: 70
End: 2025-08-06

## 2025-08-07 ENCOUNTER — RX RENEWAL (OUTPATIENT)
Age: 70
End: 2025-08-07

## 2025-08-11 ENCOUNTER — APPOINTMENT (OUTPATIENT)
Dept: PULMONOLOGY | Facility: CLINIC | Age: 70
End: 2025-08-11

## 2025-08-18 ENCOUNTER — APPOINTMENT (OUTPATIENT)
Dept: FAMILY MEDICINE | Facility: CLINIC | Age: 70
End: 2025-08-18

## 2025-08-20 ENCOUNTER — RX RENEWAL (OUTPATIENT)
Age: 70
End: 2025-08-20

## 2025-08-22 ENCOUNTER — LABORATORY RESULT (OUTPATIENT)
Age: 70
End: 2025-08-22

## 2025-08-22 ENCOUNTER — APPOINTMENT (OUTPATIENT)
Dept: FAMILY MEDICINE | Facility: CLINIC | Age: 70
End: 2025-08-22
Payer: MEDICARE

## 2025-08-22 VITALS
SYSTOLIC BLOOD PRESSURE: 120 MMHG | BODY MASS INDEX: 42.88 KG/M2 | HEART RATE: 77 BPM | DIASTOLIC BLOOD PRESSURE: 80 MMHG | HEIGHT: 63 IN | WEIGHT: 242 LBS | OXYGEN SATURATION: 96 %

## 2025-08-22 DIAGNOSIS — F41.1 GENERALIZED ANXIETY DISORDER: ICD-10-CM

## 2025-08-22 DIAGNOSIS — I50.9 HEART FAILURE, UNSPECIFIED: ICD-10-CM

## 2025-08-22 DIAGNOSIS — R91.1 SOLITARY PULMONARY NODULE: ICD-10-CM

## 2025-08-22 DIAGNOSIS — M79.9 SOFT TISSUE DISORDER, UNSPECIFIED: ICD-10-CM

## 2025-08-22 DIAGNOSIS — J44.9 CHRONIC OBSTRUCTIVE PULMONARY DISEASE, UNSPECIFIED: ICD-10-CM

## 2025-08-22 DIAGNOSIS — R73.03 PREDIABETES.: ICD-10-CM

## 2025-08-22 DIAGNOSIS — I48.91 UNSPECIFIED ATRIAL FIBRILLATION: ICD-10-CM

## 2025-08-22 DIAGNOSIS — M48.062 SPINAL STENOSIS, LUMBAR REGION WITH NEUROGENIC CLAUDICATION: ICD-10-CM

## 2025-08-22 DIAGNOSIS — K76.0 FATTY (CHANGE OF) LIVER, NOT ELSEWHERE CLASSIFIED: ICD-10-CM

## 2025-08-22 DIAGNOSIS — I10 ESSENTIAL (PRIMARY) HYPERTENSION: ICD-10-CM

## 2025-08-22 DIAGNOSIS — E55.9 VITAMIN D DEFICIENCY, UNSPECIFIED: ICD-10-CM

## 2025-08-22 DIAGNOSIS — Z00.00 ENCOUNTER FOR GENERAL ADULT MEDICAL EXAMINATION W/OUT ABNORMAL FINDINGS: ICD-10-CM

## 2025-08-22 DIAGNOSIS — E66.813 OBESITY, CLASS 3: ICD-10-CM

## 2025-08-22 PROCEDURE — G2211 COMPLEX E/M VISIT ADD ON: CPT

## 2025-08-22 PROCEDURE — 99214 OFFICE O/P EST MOD 30 MIN: CPT | Mod: 25

## 2025-08-22 PROCEDURE — 36415 COLL VENOUS BLD VENIPUNCTURE: CPT

## 2025-08-22 PROCEDURE — G0439: CPT

## 2025-08-22 RX ORDER — SERTRALINE 25 MG/1
25 TABLET, FILM COATED ORAL
Qty: 90 | Refills: 3 | Status: ACTIVE | COMMUNITY
Start: 2025-08-22 | End: 1900-01-01

## 2025-08-22 RX ORDER — DABIGATRAN ETEXILATE 150 MG/1
150 CAPSULE ORAL TWICE DAILY
Qty: 60 | Refills: 1 | Status: ACTIVE | COMMUNITY
Start: 2025-08-22 | End: 1900-01-01

## 2025-08-23 LAB
25(OH)D3 SERPL-MCNC: 35.9 NG/ML
ALBUMIN SERPL ELPH-MCNC: 4.1 G/DL
ALP BLD-CCNC: 91 U/L
ALT SERPL-CCNC: 37 U/L
ANION GAP SERPL CALC-SCNC: 14 MMOL/L
APPEARANCE: ABNORMAL
AST SERPL-CCNC: 23 U/L
BASOPHILS # BLD AUTO: 0.03 K/UL
BASOPHILS NFR BLD AUTO: 0.3 %
BILIRUB SERPL-MCNC: 0.3 MG/DL
BILIRUBIN URINE: NEGATIVE
BLOOD URINE: NEGATIVE
BUN SERPL-MCNC: 15 MG/DL
CALCIUM SERPL-MCNC: 9.1 MG/DL
CHLORIDE SERPL-SCNC: 100 MMOL/L
CHOLEST SERPL-MCNC: 203 MG/DL
CO2 SERPL-SCNC: 25 MMOL/L
COLOR: YELLOW
CREAT SERPL-MCNC: 0.55 MG/DL
EGFRCR SERPLBLD CKD-EPI 2021: 97 ML/MIN/1.73M2
EOSINOPHIL # BLD AUTO: 0.07 K/UL
EOSINOPHIL NFR BLD AUTO: 0.8 %
ESTIMATED AVERAGE GLUCOSE: 114 MG/DL
FERRITIN SERPL-MCNC: 283 NG/ML
FOLATE SERPL-MCNC: 9.5 NG/ML
GLUCOSE QUALITATIVE U: NEGATIVE MG/DL
GLUCOSE SERPL-MCNC: 111 MG/DL
HBA1C MFR BLD HPLC: 5.6 %
HCT VFR BLD CALC: 40.1 %
HDLC SERPL-MCNC: 66 MG/DL
HGB BLD-MCNC: 12.7 G/DL
IMM GRANULOCYTES NFR BLD AUTO: 0.3 %
INR PPP: 1.42 RATIO
IRON SATN MFR SERPL: 21 %
IRON SERPL-MCNC: 66 UG/DL
KETONES URINE: NEGATIVE MG/DL
LDLC SERPL-MCNC: 108 MG/DL
LEUKOCYTE ESTERASE URINE: ABNORMAL
LYMPHOCYTES # BLD AUTO: 2.1 K/UL
LYMPHOCYTES NFR BLD AUTO: 23.2 %
MAN DIFF?: NORMAL
MCHC RBC-ENTMCNC: 29.6 PG
MCHC RBC-ENTMCNC: 31.7 G/DL
MCV RBC AUTO: 93.5 FL
MONOCYTES # BLD AUTO: 0.57 K/UL
MONOCYTES NFR BLD AUTO: 6.3 %
NEUTROPHILS # BLD AUTO: 6.25 K/UL
NEUTROPHILS NFR BLD AUTO: 69.1 %
NITRITE URINE: NEGATIVE
NONHDLC SERPL-MCNC: 137 MG/DL
PH URINE: 5.5
PLATELET # BLD AUTO: 201 K/UL
POTASSIUM SERPL-SCNC: 4.3 MMOL/L
PROT SERPL-MCNC: 6.7 G/DL
PROTEIN URINE: NEGATIVE MG/DL
PT BLD: 16.4 SEC
RBC # BLD: 4.29 M/UL
RBC # FLD: 13.9 %
SODIUM SERPL-SCNC: 139 MMOL/L
SPECIFIC GRAVITY URINE: 1.02
TIBC SERPL-MCNC: 320 UG/DL
TRIGL SERPL-MCNC: 166 MG/DL
TSH SERPL-ACNC: 0.77 UIU/ML
UIBC SERPL-MCNC: 253 UG/DL
UROBILINOGEN URINE: 0.2 MG/DL
VIT B12 SERPL-MCNC: 391 PG/ML
WBC # FLD AUTO: 9.05 K/UL

## 2025-09-04 ENCOUNTER — APPOINTMENT (OUTPATIENT)
Dept: PAIN MANAGEMENT | Facility: CLINIC | Age: 70
End: 2025-09-04
Payer: MEDICARE

## 2025-09-04 DIAGNOSIS — M48.062 SPINAL STENOSIS, LUMBAR REGION WITH NEUROGENIC CLAUDICATION: ICD-10-CM

## 2025-09-04 DIAGNOSIS — M79.2 NEURALGIA AND NEURITIS, UNSPECIFIED: ICD-10-CM

## 2025-09-04 DIAGNOSIS — M47.817 SPONDYLOSIS W/OUT MYELOPATHY OR RADICULOPATHY, LUMBOSACRAL REGION: ICD-10-CM

## 2025-09-04 DIAGNOSIS — M79.18 MYALGIA, OTHER SITE: ICD-10-CM

## 2025-09-04 DIAGNOSIS — G89.4 CHRONIC PAIN SYNDROME: ICD-10-CM

## 2025-09-04 PROCEDURE — 99214 OFFICE O/P EST MOD 30 MIN: CPT | Mod: 2W

## 2025-09-04 PROCEDURE — G2211 COMPLEX E/M VISIT ADD ON: CPT | Mod: 2W

## 2025-09-05 ENCOUNTER — RESULT REVIEW (OUTPATIENT)
Age: 70
End: 2025-09-05

## 2025-09-05 ENCOUNTER — NON-APPOINTMENT (OUTPATIENT)
Age: 70
End: 2025-09-05

## 2025-09-09 PROBLEM — M25.512 LEFT SHOULDER PAIN, UNSPECIFIED CHRONICITY: Status: ACTIVE | Noted: 2025-09-09

## 2025-09-10 ENCOUNTER — APPOINTMENT (OUTPATIENT)
Dept: PULMONOLOGY | Facility: CLINIC | Age: 70
End: 2025-09-10
Payer: MEDICARE

## 2025-09-10 ENCOUNTER — APPOINTMENT (OUTPATIENT)
Dept: ORTHOPEDIC SURGERY | Facility: CLINIC | Age: 70
End: 2025-09-10
Payer: MEDICARE

## 2025-09-10 VITALS
BODY MASS INDEX: 42.88 KG/M2 | HEART RATE: 85 BPM | RESPIRATION RATE: 18 BRPM | HEIGHT: 63 IN | OXYGEN SATURATION: 98 % | SYSTOLIC BLOOD PRESSURE: 116 MMHG | DIASTOLIC BLOOD PRESSURE: 74 MMHG | WEIGHT: 242 LBS

## 2025-09-10 DIAGNOSIS — Z87.891 PERSONAL HISTORY OF NICOTINE DEPENDENCE: ICD-10-CM

## 2025-09-10 PROBLEM — S43.422A SPRAIN OF LEFT ROTATOR CUFF CAPSULE, INITIAL ENCOUNTER: Status: ACTIVE | Noted: 2025-09-10

## 2025-09-10 PROBLEM — M25.812 IMPINGEMENT OF LEFT SHOULDER: Status: ACTIVE | Noted: 2025-09-10

## 2025-09-10 PROBLEM — M75.22 LEFT BICIPITAL TENOSYNOVITIS: Status: ACTIVE | Noted: 2025-09-10

## 2025-09-10 PROCEDURE — 20611 DRAIN/INJ JOINT/BURSA W/US: CPT | Mod: LT

## 2025-09-10 PROCEDURE — 99204 OFFICE O/P NEW MOD 45 MIN: CPT | Mod: 25

## 2025-09-10 PROCEDURE — 73030 X-RAY EXAM OF SHOULDER: CPT | Mod: LT

## 2025-09-10 PROCEDURE — G0296 VISIT TO DETERM LDCT ELIG: CPT | Mod: 93

## 2025-09-11 VITALS — WEIGHT: 242 LBS | HEIGHT: 63 IN | BODY MASS INDEX: 42.88 KG/M2

## 2025-09-16 ENCOUNTER — APPOINTMENT (OUTPATIENT)
Dept: ENDOCRINOLOGY | Facility: CLINIC | Age: 70
End: 2025-09-16
Payer: MEDICARE

## 2025-09-16 DIAGNOSIS — E55.9 VITAMIN D DEFICIENCY, UNSPECIFIED: ICD-10-CM

## 2025-09-16 DIAGNOSIS — Z86.39 PERSONAL HISTORY OF OTHER ENDOCRINE, NUTRITIONAL AND METABOLIC DISEASE: ICD-10-CM

## 2025-09-16 DIAGNOSIS — Z87.442 PERSONAL HISTORY OF URINARY CALCULI: ICD-10-CM

## 2025-09-16 DIAGNOSIS — E04.2 NONTOXIC MULTINODULAR GOITER: ICD-10-CM

## 2025-09-16 PROCEDURE — 99215 OFFICE O/P EST HI 40 MIN: CPT | Mod: 2W

## 2025-09-16 PROCEDURE — G2211 COMPLEX E/M VISIT ADD ON: CPT | Mod: 2W

## 2025-09-16 RX ORDER — TRAMADOL HYDROCHLORIDE 50 MG/1
50 TABLET, COATED ORAL
Qty: 20 | Refills: 0 | Status: DISCONTINUED | COMMUNITY
Start: 2025-09-10 | End: 2025-09-16

## 2025-09-16 RX ORDER — TRAMADOL HYDROCHLORIDE 50 MG/1
50 TABLET, COATED ORAL
Qty: 10 | Refills: 0 | Status: DISCONTINUED | COMMUNITY
Start: 2025-09-11 | End: 2025-09-16

## 2025-09-17 ENCOUNTER — RESULT REVIEW (OUTPATIENT)
Age: 70
End: 2025-09-17

## 2025-09-17 PROBLEM — Z87.442 HISTORY OF KIDNEY STONES: Status: RESOLVED | Noted: 2021-12-03 | Resolved: 2025-09-17

## 2025-09-20 ENCOUNTER — NON-APPOINTMENT (OUTPATIENT)
Age: 70
End: 2025-09-20

## 2025-09-20 DIAGNOSIS — M79.9 SOFT TISSUE DISORDER, UNSPECIFIED: ICD-10-CM

## 2025-09-20 DIAGNOSIS — M75.22 BICIPITAL TENDINITIS, LEFT SHOULDER: ICD-10-CM

## 2025-09-20 DIAGNOSIS — S43.422A SPRAIN OF LEFT ROTATOR CUFF CAPSULE, INITIAL ENCOUNTER: ICD-10-CM

## 2025-09-20 DIAGNOSIS — M79.18 MYALGIA, OTHER SITE: ICD-10-CM

## 2025-09-20 DIAGNOSIS — M79.2 NEURALGIA AND NEURITIS, UNSPECIFIED: ICD-10-CM

## 2025-09-20 DIAGNOSIS — G47.34 IDIOPATHIC SLEEP RELATED NONOBSTRUCTIVE ALVEOLAR HYPOVENTILATION: ICD-10-CM

## 2025-09-20 DIAGNOSIS — M47.817 SPONDYLOSIS W/OUT MYELOPATHY OR RADICULOPATHY, LUMBOSACRAL REGION: ICD-10-CM

## 2025-09-20 DIAGNOSIS — G89.4 CHRONIC PAIN SYNDROME: ICD-10-CM

## 2025-09-20 DIAGNOSIS — M25.812 OTHER SPECIFIED JOINT DISORDERS, LEFT SHOULDER: ICD-10-CM

## 2025-09-24 PROBLEM — I51.81 STRESS-INDUCED CARDIOMYOPATHY: Status: ACTIVE | Noted: 2025-09-24

## 2025-09-24 PROBLEM — Z87.19 HISTORY OF RECTAL BLEEDING: Status: RESOLVED | Noted: 2022-10-17 | Resolved: 2025-09-24

## 2025-09-25 PROBLEM — E66.01 OBESITY, MORBID (MORE THAN 100 LBS OVER IDEAL WEIGHT OR BMI > 40): Status: ACTIVE | Noted: 2025-09-25
